# Patient Record
Sex: FEMALE | Race: WHITE | Employment: OTHER | URBAN - METROPOLITAN AREA
[De-identification: names, ages, dates, MRNs, and addresses within clinical notes are randomized per-mention and may not be internally consistent; named-entity substitution may affect disease eponyms.]

---

## 2018-02-12 PROBLEM — L08.9 FOOT INFECTION: Status: ACTIVE | Noted: 2018-02-12

## 2018-02-13 PROBLEM — M06.9 RHEUMATOID ARTHRITIS (HCC): Status: ACTIVE | Noted: 2018-02-13

## 2018-02-15 PROBLEM — I73.9 PERIPHERAL VASCULAR DISEASE (HCC): Status: ACTIVE | Noted: 2018-02-15

## 2018-02-15 PROBLEM — L03.116 CELLULITIS OF LEFT FOOT: Status: ACTIVE | Noted: 2018-02-12

## 2018-07-16 ENCOUNTER — HOSPITAL ENCOUNTER (OUTPATIENT)
Age: 78
Setting detail: OUTPATIENT SURGERY
Discharge: HOME | End: 2018-07-16
Attending: SURGERY | Admitting: SURGERY
Payer: MEDICARE

## 2018-07-16 ENCOUNTER — HOSPITAL ENCOUNTER (EMERGENCY)
Age: 78
Discharge: STILL ADMITTED OR ADMINISTRATIVE | End: 2018-07-16
Attending: EMERGENCY MEDICINE
Payer: MEDICARE

## 2018-07-16 VITALS
RESPIRATION RATE: 16 BRPM | SYSTOLIC BLOOD PRESSURE: 162 MMHG | WEIGHT: 161.9 LBS | BODY MASS INDEX: 25.41 KG/M2 | HEART RATE: 65 BPM | DIASTOLIC BLOOD PRESSURE: 67 MMHG | TEMPERATURE: 97.7 F | HEIGHT: 67 IN | OXYGEN SATURATION: 95 %

## 2018-07-16 DIAGNOSIS — M79.604 RIGHT LEG PAIN: ICD-10-CM

## 2018-07-16 DIAGNOSIS — I73.9 PVD (PERIPHERAL VASCULAR DISEASE) (HCC): ICD-10-CM

## 2018-07-16 DIAGNOSIS — I73.9 RIGHT LEG CLAUDICATION (HCC): Primary | ICD-10-CM

## 2018-07-16 DIAGNOSIS — I70.90 ATHEROSCLEROSIS: ICD-10-CM

## 2018-07-16 LAB
ANION GAP SERPL CALC-SCNC: 10 MMOL/L (ref 3–18)
APTT PPP: 26.6 SEC (ref 23–36.4)
BASOPHILS # BLD: 0 K/UL (ref 0–0.1)
BASOPHILS NFR BLD: 0 % (ref 0–2)
BUN SERPL-MCNC: 43 MG/DL (ref 7–18)
BUN/CREAT SERPL: 22 (ref 12–20)
CALCIUM SERPL-MCNC: 8.6 MG/DL (ref 8.5–10.1)
CHLORIDE SERPL-SCNC: 106 MMOL/L (ref 100–108)
CO2 SERPL-SCNC: 24 MMOL/L (ref 21–32)
CREAT SERPL-MCNC: 1.92 MG/DL (ref 0.6–1.3)
DIFFERENTIAL METHOD BLD: ABNORMAL
EOSINOPHIL # BLD: 0 K/UL (ref 0–0.4)
EOSINOPHIL NFR BLD: 0 % (ref 0–5)
ERYTHROCYTE [DISTWIDTH] IN BLOOD BY AUTOMATED COUNT: 13.2 % (ref 11.6–14.5)
GLUCOSE SERPL-MCNC: 129 MG/DL (ref 74–99)
HCT VFR BLD AUTO: 38.2 % (ref 35–45)
HGB BLD-MCNC: 12.9 G/DL (ref 12–16)
INR PPP: 1 (ref 0.8–1.2)
LYMPHOCYTES # BLD: 0.6 K/UL (ref 0.9–3.6)
LYMPHOCYTES NFR BLD: 9 % (ref 21–52)
MCH RBC QN AUTO: 29.1 PG (ref 24–34)
MCHC RBC AUTO-ENTMCNC: 33.8 G/DL (ref 31–37)
MCV RBC AUTO: 86.2 FL (ref 74–97)
MONOCYTES # BLD: 0 K/UL (ref 0.05–1.2)
MONOCYTES NFR BLD: 0 % (ref 3–10)
NEUTS SEG # BLD: 5.9 K/UL (ref 1.8–8)
NEUTS SEG NFR BLD: 91 % (ref 40–73)
PLATELET # BLD AUTO: 225 K/UL (ref 135–420)
PMV BLD AUTO: 9.4 FL (ref 9.2–11.8)
POTASSIUM SERPL-SCNC: 4.6 MMOL/L (ref 3.5–5.5)
PROTHROMBIN TIME: 12.4 SEC (ref 11.5–15.2)
RBC # BLD AUTO: 4.43 M/UL (ref 4.2–5.3)
SODIUM SERPL-SCNC: 140 MMOL/L (ref 136–145)
WBC # BLD AUTO: 6.5 K/UL (ref 4.6–13.2)

## 2018-07-16 PROCEDURE — C1894 INTRO/SHEATH, NON-LASER: HCPCS | Performed by: SURGERY

## 2018-07-16 PROCEDURE — 36140 INTRO NDL ICATH UPR/LXTR ART: CPT

## 2018-07-16 PROCEDURE — 74011250636 HC RX REV CODE- 250/636

## 2018-07-16 PROCEDURE — 80048 BASIC METABOLIC PNL TOTAL CA: CPT | Performed by: SURGERY

## 2018-07-16 PROCEDURE — 77030028837 HC SYR ANGI PWR INJ COEU -A: Performed by: SURGERY

## 2018-07-16 PROCEDURE — 85730 THROMBOPLASTIN TIME PARTIAL: CPT | Performed by: SURGERY

## 2018-07-16 PROCEDURE — 75810000275 HC EMERGENCY DEPT VISIT NO LEVEL OF CARE

## 2018-07-16 PROCEDURE — 75710 ARTERY X-RAYS ARM/LEG: CPT | Performed by: SURGERY

## 2018-07-16 PROCEDURE — 74011250636 HC RX REV CODE- 250/636: Performed by: SURGERY

## 2018-07-16 PROCEDURE — 99152 MOD SED SAME PHYS/QHP 5/>YRS: CPT | Performed by: SURGERY

## 2018-07-16 PROCEDURE — 99153 MOD SED SAME PHYS/QHP EA: CPT | Performed by: SURGERY

## 2018-07-16 PROCEDURE — 85025 COMPLETE CBC W/AUTO DIFF WBC: CPT | Performed by: SURGERY

## 2018-07-16 PROCEDURE — 74011000250 HC RX REV CODE- 250: Performed by: SURGERY

## 2018-07-16 PROCEDURE — 85610 PROTHROMBIN TIME: CPT | Performed by: SURGERY

## 2018-07-16 RX ORDER — CLINDAMYCIN PHOSPHATE 900 MG/50ML
900 INJECTION INTRAVENOUS ONCE
Status: COMPLETED | OUTPATIENT
Start: 2018-07-16 | End: 2018-07-16

## 2018-07-16 RX ORDER — MIDAZOLAM HYDROCHLORIDE 1 MG/ML
INJECTION, SOLUTION INTRAMUSCULAR; INTRAVENOUS AS NEEDED
Status: DISCONTINUED | OUTPATIENT
Start: 2018-07-16 | End: 2018-07-16 | Stop reason: HOSPADM

## 2018-07-16 RX ORDER — FENTANYL CITRATE 50 UG/ML
INJECTION, SOLUTION INTRAMUSCULAR; INTRAVENOUS AS NEEDED
Status: DISCONTINUED | OUTPATIENT
Start: 2018-07-16 | End: 2018-07-16 | Stop reason: HOSPADM

## 2018-07-16 RX ORDER — METHOTREXATE 2.5 MG/1
2.5 TABLET ORAL
Status: ON HOLD | COMMUNITY
End: 2018-09-20

## 2018-07-16 RX ORDER — SODIUM CHLORIDE 9 MG/ML
50 INJECTION, SOLUTION INTRAVENOUS CONTINUOUS
Status: DISCONTINUED | OUTPATIENT
Start: 2018-07-16 | End: 2018-07-16 | Stop reason: HOSPADM

## 2018-07-16 RX ORDER — LIDOCAINE HYDROCHLORIDE 10 MG/ML
INJECTION INFILTRATION; PERINEURAL AS NEEDED
Status: DISCONTINUED | OUTPATIENT
Start: 2018-07-16 | End: 2018-07-16 | Stop reason: HOSPADM

## 2018-07-16 RX ADMIN — CLINDAMYCIN PHOSPHATE 900 MG: 18 INJECTION, SOLUTION INTRAMUSCULAR; INTRAVENOUS at 15:00

## 2018-07-16 NOTE — H&P
[facility] 
[address], [address] [facility] [address] Geri Jovel, Dr. Markus Mccormick & Dr. Yaneli Almaguer [phone] FAX# [phone] History and Physical 
 
Patient: Juancho Suarez MRN: [id number]  SSN: [id number] YOB: 1940  Age: 66 y.o. Sex: female Subjective:  
  
Juancho Suarez is a 66 y.o. female who presents with ASO and critical ischemia of the toes. Conventional  Angio failed to open tibial arteries. .  
 
Past Medical History:  
Diagnosis Date  Asthma   
 no ER  
 GERD (gastroesophageal reflux disease)   
 good control  Hypertension   
 no meds  Osteoarthritis of right hip 8/27/2012  RHA (rheumatoid arthritis) ([address]  Thromboembolus ([address]   
 left leg Past Surgical History:  
Procedure Laterality Date  HX APPENDECTOMY  HX BACK SURGERY    
 removal bone spur  HX CATARACT REMOVAL    
 both eyes  HX CHOLECYSTECTOMY  HX HIP REPLACEMENT Right  HX HYSTERECTOMY  HX LUMBAR FUSION    
 HX ORTHOPAEDIC Left   
 amputation 3&4 toes of Left foot  VASCULAR SURGERY PROCEDURE UNLIST    
 blood clot removed No family history on file. Social History Substance Use Topics  Smoking status: Former Smoker Packs/day: 0.50 Years: 50.00 Quit date: 12/25/2017  Smokeless tobacco: Never Used  Alcohol use No  
  
Prior to Admission medications Medication Sig Start Date End Date Taking? Authorizing Provider  
lisinopril (PRINIVIL, ZESTRIL) 10 mg tablet Take  by mouth daily. Phys Other, MD  
acetaminophen (ACETAMINOPHEN EXTRA STRENGTH) 500 mg tablet Take 1,000 mg by mouth every six (6) hours as needed. Historical Provider Allergies Allergen Reactions  Aspirin Nausea and Vomiting  Iodine Hives and Itching  Pcn [Penicillins] Nausea and Vomiting  
  shakes Review of Systems: 
Pertinent items are noted in the History of Present Illness. Objective:  
 
Vitals:  
 07/16/18 1432 BP: 162/73 Pulse: 61 Resp: 18 Temp: 97.7 °F (36.5 °C) SpO2: 100% Weight: 73.4 kg (161 lb 14.4 oz) Height: 5' 7\" (1.702 m) Physical Exam: 
GENERAL: alert, cooperative, no distress, appears stated age EYE: negative LUNG: clear to auscultation bilaterally HEART: regular rate and rhythm, S1, S2 normal, no murmur, click, rub or gallop ABDOMEN: soft, non-tender. Bowel sounds normal. No masses,  no organomegaly EXTREMITIES: Right leg incisions well healed. Left foot with two toes with ischemic changes. Assessment:  
 
ASO with gangrene Plan: Attempt at pedal access angiogram with possible intervention. Signed By: Jolly Gosselin, MD   
 July 16, 2018

## 2018-07-16 NOTE — IP AVS SNAPSHOT
[address] Patient: Segundo Riley MRN: [id number] WOW:0/30/2156 About your hospitalization You were admitted on:  July 16, 2018 You last received care in the:  [address] [place] [address] You were discharged on:  July 16, 2018 Why you were hospitalized Your primary diagnosis was:  Not on File Follow-up Information Follow up With Details Comments Contact Info Mau Adams NP   [address] [facility] [address] [facility] [address] [phone] Discharge Orders None A check mau indicates which time of day the medication should be taken. My Medications CONTINUE taking these medications Instructions Each Dose to Equal  
 Morning Noon Evening Bedtime ACETAMINOPHEN EXTRA STRENGTH 500 mg tablet Generic drug:  acetaminophen Your last dose was: Your next dose is: Take 1,000 mg by mouth every six (6) hours as needed. 1000 mg  
    
   
   
   
  
 lisinopril 10 mg tablet Commonly known as:  Dickson Osmar Your last dose was: Your next dose is: Take  by mouth daily. methotrexate 2.5 mg tablet Commonly known as:  Andrea Savage Your last dose was: Your next dose is: Take 2.5 mg by mouth Every Thursday. 2.5 mg  
    
   
   
   
  
  
  
  
Discharge Instructions DISCHARGE SUMMARY from Nurse PATIENT INSTRUCTIONS: 
 
After general anesthesia or intravenous sedation, for 24 hours or while taking prescription Narcotics: · Limit your activities · Do not drive and operate hazardous machinery · Do not make important personal or business decisions · Do  not drink alcoholic beverages · If you have not urinated within 8 hours after discharge, please contact your surgeon on call. Report the following to your surgeon: · Excessive pain, swelling, redness or odor of or around the surgical area · Temperature over 100.5 · Nausea and vomiting lasting longer than 4 hours or if unable to take medications · Any signs of decreased circulation or nerve impairment to extremity: change in color, persistent  numbness, tingling, coldness or increase pain · Any questions What to do at Home: These are general instructions for a healthy lifestyle: No smoking/ No tobacco products/ Avoid exposure to second hand smoke Surgeon General's Warning:  Quitting smoking now greatly reduces serious risk to your health. Obesity, smoking, and sedentary lifestyle greatly increases your risk for illness A healthy diet, regular physical exercise & weight monitoring are important for maintaining a healthy lifestyle You may be retaining fluid if you have a history of heart failure or if you experience any of the following symptoms:  Weight gain of 3 pounds or more overnight or 5 pounds in a week, increased swelling in our hands or feet or shortness of breath while lying flat in bed. Please call your doctor as soon as you notice any of these symptoms; do not wait until your next office visit. Recognize signs and symptoms of STROKE: 
 
F-face looks uneven A-arms unable to move or move unevenly S-speech slurred or non-existent T-time-call 911 as soon as signs and symptoms begin-DO NOT go Back to bed or wait to see if you get better-TIME IS BRAIN. Warning Signs of HEART ATTACK Call 911 if you have these symptoms: 
? Chest discomfort. Most heart attacks involve discomfort in the center of the chest that lasts more than a few minutes, or that goes away and comes back. It can feel like uncomfortable pressure, squeezing, fullness, or pain. ? Discomfort in other areas of the upper body. Symptoms can include pain or discomfort in one or both arms, the back, neck, jaw, or stomach. ? Shortness of breath with or without chest discomfort. ? Other signs may include breaking out in a cold sweat, nausea, or lightheadedness. Don't wait more than five minutes to call [address]! Fast action can save your life. Calling 911 is almost always the fastest way to get lifesaving treatment. Emergency Medical Services staff can begin treatment when they arrive  up to an hour sooner than if someone gets to the hospital by car. The discharge information has been reviewed with the patient. The patient verbalized understanding. Discharge medications reviewed with the patient and appropriate educational materials and side effects teaching were provided. ___________________________________________________________________________________________________________________________________ Patient armband removed and given to patient to take home. Patient was informed of the privacy risks if armband lost or stolen Introducing [facility] & HEALTH SERVICES! [facility] introduces [employer] patient portal. Now you can access parts of your medical record, email your doctor's office, and request medication refills online. 1. In your internet browser, go to [email or web address] 2. Click on the First Time User? Click Here link in the Sign In box. You will see the New Member Sign Up page. 3. Enter your [employer] Access Code exactly as it appears below. You will not need to use this code after youve completed the sign-up process. If you do not sign up before the expiration date, you must request a new code. · [employer] Access Code: [email or web address] Expires: 10/11/2018 10:33 AM 
 
4. Enter the last four digits of your Social Security Number (xxxx) and Date of Birth (mm/dd/yyyy) as indicated and click Submit. You will be taken to the next sign-up page. 5. Create a MyChart ID.  This will be your [employer] login ID and cannot be changed, so think of one that is secure and easy to remember. 6. Create a [employer] password. You can change your password at any time. 7. Enter your Password Reset Question and Answer. This can be used at a later time if you forget your password. 8. Enter your e-mail address. You will receive e-mail notification when new information is available in [address] 9. Click Sign Up. You can now view and download portions of your medical record. 10. Click the Download Summary menu link to download a portable copy of your medical information. If you have questions, please visit the Frequently Asked Questions section of the [employer] website. Remember, [employer] is NOT to be used for urgent needs. For medical emergencies, dial 911. Now available from your iPhone and Android! Introducing Jacky Conte As a [facility] patient, I wanted to make you aware of our electronic visit tool called Jacky Conte. [employer] allows you to connect within minutes with a medical provider 24 hours a day, seven days a week via a mobile device or tablet or logging into a secure website from your computer. You can access Jacky Conte from anywhere in the [place]. A virtual visit might be right for you when you have a simple condition and feel like you just dont want to get out of bed, or cant get away from work for an appointment, when your regular [facility] provider is not available (evenings, weekends or holidays), or when youre out of town and need minor care. Electronic visits cost only $49 and if the [employer] provider determines a prescription is needed to treat your condition, one can be electronically transmitted to a nearby pharmacy*. Please take a moment to enroll today if you have not already done so. The enrollment process is free and takes just a few minutes.   To enroll, please download the [facility] jalil to your tablet or phone, or visit [email or web address] to enroll on your computer. And, as an [address] [place] [address] patient with a [employer] account, the results of your visits will be scanned into your electronic medical record and your primary care provider will be able to view the scanned results. We urge you to continue to see your regular [facility] provider for your ongoing medical care. And while your primary care provider may not be the one available when you seek a ffk environment virtual visit, the peace of mind you get from getting a real diagnosis real time can be priceless. For more information on ffk environment, view our Frequently Asked Questions (FAQs) at [email or web address]. Sincerely, 
 
Elissa Payne MD 
Chief Medical Officer [employer] *:  certain medications cannot be prescribed via ffk environment Providers Seen During Your Hospitalization Provider Specialty Primary office phone Jenn Vega MD Vascular Surgery [phone] Your Primary Care Physician (PCP) Primary Care Physician Office Phone Office Fax Rakan Kent [address] [phone] You are allergic to the following Allergen Reactions Aspirin Nausea and Vomiting Iodine Hives Itching Pcn (Penicillins) Nausea and Vomiting  
 shakes Recent Documentation Height Weight BMI OB Status Smoking Status 1.702 m 73.4 kg 25.36 kg/m2 Hysterectomy Former Smoker Emergency Contacts Name Discharge Info Relation Home Work Mobile [employer] CAREGIVER [3] Spouse [3] [phone] Patient Belongings The following personal items are in your possession at time of discharge: 
  Dental Appliances: None         Home Medications: None   Jewelry: None  Clothing: Footwear, Undergarments, Pants, Shirt Please provide this summary of care documentation to your next provider. Signatures-by signing, you are acknowledging that this After Visit Summary has been reviewed with you and you have received a copy. Patient Signature:  ____________________________________________________________ Date:  ____________________________________________________________  
  
Lucy Hero Provider Signature:  ____________________________________________________________ Date:  ____________________________________________________________

## 2018-07-16 NOTE — PROCEDURES
[facility]  [address], [address] [facility]  Dr. Eustacio Curling, Dr. Chalino Varghese  [phone] FAX# [phone]      Date: 7/16/2018  Pre-operative diagnosis: ASO with ulceration  Post-operative diagnosis: Same  Procedure: Ultrasound guidance for the cannulation of the left posterior tibial artery  Surgeon[facility]  Anesthesia\": Local with sedation  EBL\" Minimal;  Complications: None  Implants: None  Specimens: None    After informed consent obtained, patient placed supine on the table. Prepped and draped in the normal sterile fashion. Right posterior tibial artery identified under ultrasound guidance. Local anesthesia given in the skin overlying. Artery cannulated with micropuncture needle. The microwire would not pass. Several more attempts prove useless. Procedure aborted. Patient tolerated well.

## 2018-07-16 NOTE — DISCHARGE INSTRUCTIONS
DISCHARGE SUMMARY from Nurse    PATIENT INSTRUCTIONS:    After general anesthesia or intravenous sedation, for 24 hours or while taking prescription Narcotics:  · Limit your activities  · Do not drive and operate hazardous machinery  · Do not make important personal or business decisions  · Do  not drink alcoholic beverages  · If you have not urinated within 8 hours after discharge, please contact your surgeon on call. Report the following to your surgeon:  · Excessive pain, swelling, redness or odor of or around the surgical area  · Temperature over 100.5  · Nausea and vomiting lasting longer than 4 hours or if unable to take medications  · Any signs of decreased circulation or nerve impairment to extremity: change in color, persistent  numbness, tingling, coldness or increase pain  · Any questions    What to do at Home:  These are general instructions for a healthy lifestyle:    No smoking/ No tobacco products/ Avoid exposure to second hand smoke  Surgeon General's Warning:  Quitting smoking now greatly reduces serious risk to your health. Obesity, smoking, and sedentary lifestyle greatly increases your risk for illness    A healthy diet, regular physical exercise & weight monitoring are important for maintaining a healthy lifestyle    You may be retaining fluid if you have a history of heart failure or if you experience any of the following symptoms:  Weight gain of 3 pounds or more overnight or 5 pounds in a week, increased swelling in our hands or feet or shortness of breath while lying flat in bed. Please call your doctor as soon as you notice any of these symptoms; do not wait until your next office visit. Recognize signs and symptoms of STROKE:    F-face looks uneven    A-arms unable to move or move unevenly    S-speech slurred or non-existent    T-time-call 911 as soon as signs and symptoms begin-DO NOT go       Back to bed or wait to see if you get better-TIME IS BRAIN.     Warning Signs of HEART ATTACK     Call 911 if you have these symptoms:   Chest discomfort. Most heart attacks involve discomfort in the center of the chest that lasts more than a few minutes, or that goes away and comes back. It can feel like uncomfortable pressure, squeezing, fullness, or pain.  Discomfort in other areas of the upper body. Symptoms can include pain or discomfort in one or both arms, the back, neck, jaw, or stomach.  Shortness of breath with or without chest discomfort.  Other signs may include breaking out in a cold sweat, nausea, or lightheadedness. Don't wait more than five minutes to call 911 - MINUTES MATTER! Fast action can save your life. Calling 911 is almost always the fastest way to get lifesaving treatment. Emergency Medical Services staff can begin treatment when they arrive -- up to an hour sooner than if someone gets to the hospital by car. The discharge information has been reviewed with the patient. The patient verbalized understanding. Discharge medications reviewed with the patient and appropriate educational materials and side effects teaching were provided. ___________________________________________________________________________________________________________________________________  Patient armband removed and given to patient to take home.   Patient was informed of the privacy risks if armband lost or stolen

## 2018-07-16 NOTE — Clinical Note
History and physical documented and up to date, allergies reviewed, lab results reviewed, pre-procedure education provided, patient verbalized understanding of procedure, procedural consent signed and patient is NPO.

## 2018-07-16 NOTE — Clinical Note
artery. accessed successfully using ultrasound guidance. Number of attempts =  1.  LT Posterior tibial

## 2018-08-20 RX ORDER — FOLIC ACID 1 MG/1
TABLET ORAL
Status: ON HOLD | COMMUNITY
End: 2018-09-20

## 2018-08-20 RX ORDER — CALC/MAG/B COMPLEX/D3/HERB 61
TABLET ORAL
COMMUNITY
End: 2019-03-05

## 2018-08-20 RX ORDER — OXYCODONE AND ACETAMINOPHEN 5; 325 MG/1; MG/1
TABLET ORAL
Status: ON HOLD | COMMUNITY
Start: 2018-04-06 | End: 2018-09-20

## 2018-08-20 RX ORDER — LORATADINE 10 MG/1
TABLET ORAL
COMMUNITY

## 2018-08-20 NOTE — PERIOP NOTES
PAT - SURGICAL PRE-ADMISSION INSTRUCTIONS    NAME:  Cielo PALENCIA [facility] DATE:  8/20/2018    SURGERY DATE:  8/23/2018                                  SURGERY ARRIVAL TIME:   1030    1. Do NOT eat or drink anything, including candy or gum, after MIDNIGHT on 08/22/2018 , unless you have specific instructions from your Surgeon or Anesthesia Provider to do so. 2. No smoking on the day of surgery. 3. No alcohol 24 hours prior to the day of surgery. 4. No recreational drugs for one week prior to the day of surgery. 5. Leave all valuables, including money/purse, at home. 6. Remove all jewelry, nail polish, makeup (including mascara); no lotions, powders, deodorant, or perfume/cologne/after shave. 7. Glasses/Contact lenses and Dentures may be worn to the hospital.  They will be removed prior to surgery. 8. Call your doctor if symptoms of a cold or illness develop within 24 ours prior to surgery. 9. AN ADULT MUST DRIVE YOU HOME AFTER OUTPATIENT SURGERY. 10. If you are having an OUTPATIENT procedure, please make arrangements for a responsible adult to be with you for 24 hours after your surgery. 11. If you are admitted to the hospital, you will be assigned to a bed after surgery is complete. Normally a family member will not be able to see you until you are in your assigned bed. 15. Family is encouraged to accompany you to the hospital.  We ask visitors in the treatment area to be limited to ONE person at a time to ensure patient privacy. EXCEPTIONS WILL BE MADE AS NEEDED. 15. Children under 12 are discouraged from entering the treatment area and need to be supervised by an adult when in the waiting room. Special Instructions:    Bring any pertinent legal medical records. Patient Prep:    shower with anti-bacterial soap    These surgical instructions were reviewed with Mauro Raya during the PAT phone call. Directions:   On the morning of surgery, please go to the [facility]. Enter the building from the [facility] entrance, 1st floor (next to the Emergency Room entrance). Take the elevator to the 2nd floor. Sign in at the Registration Desk.     If you have any questions and/or concerns, please do not hesitate to call:  (Prior to the day of surgery)  [facility] unit:  [phone]  (Day of surgery)  [facility] unit:  [phone]

## 2018-08-22 ENCOUNTER — ANESTHESIA EVENT (OUTPATIENT)
Dept: SURGERY | Age: 78
DRG: 254 | End: 2018-08-22
Payer: MEDICARE

## 2018-08-23 ENCOUNTER — HOSPITAL ENCOUNTER (INPATIENT)
Age: 78
LOS: 4 days | Discharge: HOME HEALTH | DRG: 254 | End: 2018-08-27
Attending: SURGERY | Admitting: SURGERY
Payer: MEDICARE

## 2018-08-23 ENCOUNTER — ANESTHESIA (OUTPATIENT)
Dept: SURGERY | Age: 78
DRG: 254 | End: 2018-08-23
Payer: MEDICARE

## 2018-08-23 PROBLEM — I70.209 ATHEROSCLEROSIS OF ARTERIES OF EXTREMITIES (HCC): Status: ACTIVE | Noted: 2018-08-23

## 2018-08-23 LAB
ABO + RH BLD: NORMAL
ACT BLD: 114 SECS (ref 79–138)
ACT BLD: 230 SECS (ref 79–138)
BLOOD GROUP ANTIBODIES SERPL: NORMAL
BUN BLD-MCNC: 41 MG/DL (ref 7–18)
CHLORIDE BLD-SCNC: 107 MMOL/L (ref 100–108)
GLUCOSE BLD STRIP.AUTO-MCNC: 95 MG/DL (ref 74–106)
HCT VFR BLD CALC: 34 % (ref 36–49)
HGB BLD-MCNC: 11.6 G/DL (ref 12–16)
POTASSIUM BLD-SCNC: 4.7 MMOL/L (ref 3.5–5.5)
SODIUM BLD-SCNC: 139 MMOL/L (ref 136–145)
SPECIMEN EXP DATE BLD: NORMAL

## 2018-08-23 PROCEDURE — 74011250636 HC RX REV CODE- 250/636

## 2018-08-23 PROCEDURE — 74011000250 HC RX REV CODE- 250

## 2018-08-23 PROCEDURE — 77030008462 HC STPLR SKN PROX J&J -A: Performed by: SURGERY

## 2018-08-23 PROCEDURE — 74011000250 HC RX REV CODE- 250: Performed by: SURGERY

## 2018-08-23 PROCEDURE — 74011250636 HC RX REV CODE- 250/636: Performed by: SURGERY

## 2018-08-23 PROCEDURE — 77030002933 HC SUT MCRYL J&J -A: Performed by: SURGERY

## 2018-08-23 PROCEDURE — 77030011265 HC ELECTRD BLD HEX COVD -A: Performed by: SURGERY

## 2018-08-23 PROCEDURE — 77030011391 HC HD CUT VEIN URAC -E: Performed by: SURGERY

## 2018-08-23 PROCEDURE — 65610000006 HC RM INTENSIVE CARE

## 2018-08-23 PROCEDURE — 77030008477 HC STYL SATN SLP COVD -A: Performed by: ANESTHESIOLOGY

## 2018-08-23 PROCEDURE — 76010000176 HC OR TIME 4.5 TO 5 HR INTENSV-TIER 1: Performed by: SURGERY

## 2018-08-23 PROCEDURE — 77030020256 HC SOL INJ NACL 0.9%  500ML: Performed by: SURGERY

## 2018-08-23 PROCEDURE — 77030005401 HC CATH RAD ARRO -A: Performed by: ANESTHESIOLOGY

## 2018-08-23 PROCEDURE — 77030005518 HC CATH URETH FOL 2W BARD -B: Performed by: SURGERY

## 2018-08-23 PROCEDURE — 77030013079 HC BLNKT BAIR HGGR 3M -A: Performed by: ANESTHESIOLOGY

## 2018-08-23 PROCEDURE — 77030019908 HC STETH ESOPH SIMS -A: Performed by: ANESTHESIOLOGY

## 2018-08-23 PROCEDURE — 74011000272 HC RX REV CODE- 272: Performed by: SURGERY

## 2018-08-23 PROCEDURE — 74011250636 HC RX REV CODE- 250/636: Performed by: NURSE ANESTHETIST, CERTIFIED REGISTERED

## 2018-08-23 PROCEDURE — 77030031139 HC SUT VCRL2 J&J -A: Performed by: SURGERY

## 2018-08-23 PROCEDURE — 77030039266 HC ADH SKN EXOFIN S2SG -A: Performed by: SURGERY

## 2018-08-23 PROCEDURE — 77030008683 HC TU ET CUF COVD -A: Performed by: ANESTHESIOLOGY

## 2018-08-23 PROCEDURE — 86901 BLOOD TYPING SEROLOGIC RH(D): CPT

## 2018-08-23 PROCEDURE — 76210000006 HC OR PH I REC 0.5 TO 1 HR: Performed by: SURGERY

## 2018-08-23 PROCEDURE — 041N09Q BYPASS LEFT POPLITEAL ARTERY TO LOWER EXTREMITY ARTERY WITH AUTOLOGOUS VENOUS TISSUE, OPEN APPROACH: ICD-10-PCS | Performed by: SURGERY

## 2018-08-23 PROCEDURE — 77030002987 HC SUT PROL J&J -B: Performed by: SURGERY

## 2018-08-23 PROCEDURE — 77030006689 HC BLD OPHTH BVR BD -A: Performed by: SURGERY

## 2018-08-23 PROCEDURE — 85347 COAGULATION TIME ACTIVATED: CPT

## 2018-08-23 PROCEDURE — 77030014007 HC SPNG HEMSTAT J&J -B: Performed by: SURGERY

## 2018-08-23 PROCEDURE — 77030018836 HC SOL IRR NACL ICUM -A: Performed by: SURGERY

## 2018-08-23 PROCEDURE — 77030002996 HC SUT SLK J&J -A: Performed by: SURGERY

## 2018-08-23 PROCEDURE — P9041 ALBUMIN (HUMAN),5%, 50ML: HCPCS

## 2018-08-23 PROCEDURE — 03HY32Z INSERTION OF MONITORING DEVICE INTO UPPER ARTERY, PERCUTANEOUS APPROACH: ICD-10-PCS | Performed by: ANESTHESIOLOGY

## 2018-08-23 PROCEDURE — 84295 ASSAY OF SERUM SODIUM: CPT

## 2018-08-23 PROCEDURE — 77030032490 HC SLV COMPR SCD KNE COVD -B: Performed by: SURGERY

## 2018-08-23 PROCEDURE — 77030010938 HC CLP LIG TELE -A: Performed by: SURGERY

## 2018-08-23 PROCEDURE — 74011250637 HC RX REV CODE- 250/637: Performed by: NURSE ANESTHETIST, CERTIFIED REGISTERED

## 2018-08-23 PROCEDURE — 76060000040 HC ANESTHESIA 4.5 TO 5 HR: Performed by: SURGERY

## 2018-08-23 RX ORDER — NEOSTIGMINE METHYLSULFATE 5 MG/5 ML
SYRINGE (ML) INTRAVENOUS AS NEEDED
Status: DISCONTINUED | OUTPATIENT
Start: 2018-08-23 | End: 2018-08-23 | Stop reason: HOSPADM

## 2018-08-23 RX ORDER — FENTANYL CITRATE 50 UG/ML
INJECTION, SOLUTION INTRAMUSCULAR; INTRAVENOUS
Status: COMPLETED
Start: 2018-08-23 | End: 2018-08-23

## 2018-08-23 RX ORDER — OXYCODONE HYDROCHLORIDE 5 MG/1
5 TABLET ORAL
Status: DISCONTINUED | OUTPATIENT
Start: 2018-08-23 | End: 2018-08-23 | Stop reason: HOSPADM

## 2018-08-23 RX ORDER — ONDANSETRON 2 MG/ML
4 INJECTION INTRAMUSCULAR; INTRAVENOUS ONCE
Status: DISCONTINUED | OUTPATIENT
Start: 2018-08-23 | End: 2018-08-23 | Stop reason: HOSPADM

## 2018-08-23 RX ORDER — PROPOFOL 10 MG/ML
INJECTION, EMULSION INTRAVENOUS AS NEEDED
Status: DISCONTINUED | OUTPATIENT
Start: 2018-08-23 | End: 2018-08-23 | Stop reason: HOSPADM

## 2018-08-23 RX ORDER — SODIUM CHLORIDE 0.9 % (FLUSH) 0.9 %
5-10 SYRINGE (ML) INJECTION AS NEEDED
Status: DISCONTINUED | OUTPATIENT
Start: 2018-08-23 | End: 2018-08-27 | Stop reason: HOSPADM

## 2018-08-23 RX ORDER — SODIUM CHLORIDE 0.9 % (FLUSH) 0.9 %
5-10 SYRINGE (ML) INJECTION AS NEEDED
Status: DISCONTINUED | OUTPATIENT
Start: 2018-08-23 | End: 2018-08-23 | Stop reason: HOSPADM

## 2018-08-23 RX ORDER — SODIUM CHLORIDE 0.9 % (FLUSH) 0.9 %
5-10 SYRINGE (ML) INJECTION EVERY 8 HOURS
Status: DISCONTINUED | OUTPATIENT
Start: 2018-08-23 | End: 2018-08-27 | Stop reason: HOSPADM

## 2018-08-23 RX ORDER — ALBUMIN HUMAN 50 G/1000ML
SOLUTION INTRAVENOUS AS NEEDED
Status: DISCONTINUED | OUTPATIENT
Start: 2018-08-23 | End: 2018-08-23 | Stop reason: HOSPADM

## 2018-08-23 RX ORDER — LIDOCAINE HYDROCHLORIDE 20 MG/ML
INJECTION, SOLUTION EPIDURAL; INFILTRATION; INTRACAUDAL; PERINEURAL AS NEEDED
Status: DISCONTINUED | OUTPATIENT
Start: 2018-08-23 | End: 2018-08-23 | Stop reason: HOSPADM

## 2018-08-23 RX ORDER — SUCCINYLCHOLINE CHLORIDE 20 MG/ML
INJECTION INTRAMUSCULAR; INTRAVENOUS AS NEEDED
Status: DISCONTINUED | OUTPATIENT
Start: 2018-08-23 | End: 2018-08-23 | Stop reason: HOSPADM

## 2018-08-23 RX ORDER — FAMOTIDINE 20 MG/1
20 TABLET, FILM COATED ORAL ONCE
Status: COMPLETED | OUTPATIENT
Start: 2018-08-23 | End: 2018-08-23

## 2018-08-23 RX ORDER — FENTANYL CITRATE 50 UG/ML
50 INJECTION, SOLUTION INTRAMUSCULAR; INTRAVENOUS
Status: DISCONTINUED | OUTPATIENT
Start: 2018-08-23 | End: 2018-08-23 | Stop reason: HOSPADM

## 2018-08-23 RX ORDER — SODIUM CHLORIDE, SODIUM LACTATE, POTASSIUM CHLORIDE, CALCIUM CHLORIDE 600; 310; 30; 20 MG/100ML; MG/100ML; MG/100ML; MG/100ML
25 INJECTION, SOLUTION INTRAVENOUS CONTINUOUS
Status: DISCONTINUED | OUTPATIENT
Start: 2018-08-23 | End: 2018-08-23 | Stop reason: HOSPADM

## 2018-08-23 RX ORDER — LISINOPRIL 10 MG/1
10 TABLET ORAL DAILY
Status: DISCONTINUED | OUTPATIENT
Start: 2018-08-24 | End: 2018-08-27 | Stop reason: HOSPADM

## 2018-08-23 RX ORDER — BACITRACIN 50000 [IU]/1
INJECTION, POWDER, FOR SOLUTION INTRAMUSCULAR AS NEEDED
Status: DISCONTINUED | OUTPATIENT
Start: 2018-08-23 | End: 2018-08-23 | Stop reason: HOSPADM

## 2018-08-23 RX ORDER — SODIUM CHLORIDE, SODIUM LACTATE, POTASSIUM CHLORIDE, CALCIUM CHLORIDE 600; 310; 30; 20 MG/100ML; MG/100ML; MG/100ML; MG/100ML
75 INJECTION, SOLUTION INTRAVENOUS CONTINUOUS
Status: DISPENSED | OUTPATIENT
Start: 2018-08-23 | End: 2018-08-23

## 2018-08-23 RX ORDER — HYDROMORPHONE HYDROCHLORIDE 1 MG/ML
1 INJECTION, SOLUTION INTRAMUSCULAR; INTRAVENOUS; SUBCUTANEOUS
Status: DISCONTINUED | OUTPATIENT
Start: 2018-08-23 | End: 2018-08-27 | Stop reason: HOSPADM

## 2018-08-23 RX ORDER — ONDANSETRON 2 MG/ML
INJECTION INTRAMUSCULAR; INTRAVENOUS
Status: COMPLETED
Start: 2018-08-23 | End: 2018-08-23

## 2018-08-23 RX ORDER — DIPHENHYDRAMINE HYDROCHLORIDE 50 MG/ML
12.5 INJECTION, SOLUTION INTRAMUSCULAR; INTRAVENOUS
Status: DISCONTINUED | OUTPATIENT
Start: 2018-08-23 | End: 2018-08-23 | Stop reason: HOSPADM

## 2018-08-23 RX ORDER — HEPARIN SODIUM 1000 [USP'U]/ML
INJECTION, SOLUTION INTRAVENOUS; SUBCUTANEOUS AS NEEDED
Status: DISCONTINUED | OUTPATIENT
Start: 2018-08-23 | End: 2018-08-23 | Stop reason: HOSPADM

## 2018-08-23 RX ORDER — SODIUM CHLORIDE 9 MG/ML
INJECTION, SOLUTION INTRAVENOUS
Status: DISCONTINUED | OUTPATIENT
Start: 2018-08-23 | End: 2018-08-23 | Stop reason: HOSPADM

## 2018-08-23 RX ORDER — GLYCOPYRROLATE 0.2 MG/ML
INJECTION INTRAMUSCULAR; INTRAVENOUS AS NEEDED
Status: DISCONTINUED | OUTPATIENT
Start: 2018-08-23 | End: 2018-08-23 | Stop reason: HOSPADM

## 2018-08-23 RX ORDER — PANTOPRAZOLE SODIUM 40 MG/1
40 TABLET, DELAYED RELEASE ORAL
Status: DISCONTINUED | OUTPATIENT
Start: 2018-08-24 | End: 2018-08-27 | Stop reason: HOSPADM

## 2018-08-23 RX ORDER — VECURONIUM BROMIDE FOR INJECTION 1 MG/ML
INJECTION, POWDER, LYOPHILIZED, FOR SOLUTION INTRAVENOUS AS NEEDED
Status: DISCONTINUED | OUTPATIENT
Start: 2018-08-23 | End: 2018-08-23 | Stop reason: HOSPADM

## 2018-08-23 RX ORDER — FENTANYL CITRATE 50 UG/ML
INJECTION, SOLUTION INTRAMUSCULAR; INTRAVENOUS AS NEEDED
Status: DISCONTINUED | OUTPATIENT
Start: 2018-08-23 | End: 2018-08-23 | Stop reason: HOSPADM

## 2018-08-23 RX ORDER — NALOXONE HYDROCHLORIDE 0.4 MG/ML
0.1 INJECTION, SOLUTION INTRAMUSCULAR; INTRAVENOUS; SUBCUTANEOUS AS NEEDED
Status: DISCONTINUED | OUTPATIENT
Start: 2018-08-23 | End: 2018-08-23 | Stop reason: HOSPADM

## 2018-08-23 RX ORDER — ACETAMINOPHEN 325 MG/1
650 TABLET ORAL
Status: DISCONTINUED | OUTPATIENT
Start: 2018-08-23 | End: 2018-08-27 | Stop reason: HOSPADM

## 2018-08-23 RX ORDER — CALC/MAG/B COMPLEX/D3/HERB 61
15 TABLET ORAL
Status: DISCONTINUED | OUTPATIENT
Start: 2018-08-24 | End: 2018-08-23 | Stop reason: CLARIF

## 2018-08-23 RX ORDER — ONDANSETRON 2 MG/ML
4 INJECTION INTRAMUSCULAR; INTRAVENOUS
Status: DISCONTINUED | OUTPATIENT
Start: 2018-08-23 | End: 2018-08-27 | Stop reason: HOSPADM

## 2018-08-23 RX ORDER — ONDANSETRON 2 MG/ML
INJECTION INTRAMUSCULAR; INTRAVENOUS AS NEEDED
Status: DISCONTINUED | OUTPATIENT
Start: 2018-08-23 | End: 2018-08-23 | Stop reason: HOSPADM

## 2018-08-23 RX ORDER — HEPARIN SODIUM 5000 [USP'U]/ML
5000 INJECTION, SOLUTION INTRAVENOUS; SUBCUTANEOUS EVERY 8 HOURS
Status: DISCONTINUED | OUTPATIENT
Start: 2018-08-23 | End: 2018-08-27 | Stop reason: HOSPADM

## 2018-08-23 RX ORDER — LIDOCAINE HYDROCHLORIDE 10 MG/ML
0.1 INJECTION, SOLUTION EPIDURAL; INFILTRATION; INTRACAUDAL; PERINEURAL AS NEEDED
Status: DISCONTINUED | OUTPATIENT
Start: 2018-08-23 | End: 2018-08-23 | Stop reason: HOSPADM

## 2018-08-23 RX ORDER — OXYCODONE AND ACETAMINOPHEN 5; 325 MG/1; MG/1
1 TABLET ORAL
Status: DISCONTINUED | OUTPATIENT
Start: 2018-08-23 | End: 2018-08-27 | Stop reason: HOSPADM

## 2018-08-23 RX ORDER — HYDROMORPHONE HYDROCHLORIDE 1 MG/ML
0.5 INJECTION, SOLUTION INTRAMUSCULAR; INTRAVENOUS; SUBCUTANEOUS
Status: DISCONTINUED | OUTPATIENT
Start: 2018-08-23 | End: 2018-08-27 | Stop reason: HOSPADM

## 2018-08-23 RX ADMIN — FENTANYL CITRATE 50 MCG: 50 INJECTION, SOLUTION INTRAMUSCULAR; INTRAVENOUS at 18:25

## 2018-08-23 RX ADMIN — FENTANYL CITRATE 50 MCG: 50 INJECTION, SOLUTION INTRAMUSCULAR; INTRAVENOUS at 14:05

## 2018-08-23 RX ADMIN — SODIUM CHLORIDE, SODIUM LACTATE, POTASSIUM CHLORIDE, AND CALCIUM CHLORIDE 25 ML/HR: 600; 310; 30; 20 INJECTION, SOLUTION INTRAVENOUS at 11:19

## 2018-08-23 RX ADMIN — PROPOFOL 130 MG: 10 INJECTION, EMULSION INTRAVENOUS at 13:13

## 2018-08-23 RX ADMIN — FENTANYL CITRATE 50 MCG: 50 INJECTION, SOLUTION INTRAMUSCULAR; INTRAVENOUS at 18:10

## 2018-08-23 RX ADMIN — VECURONIUM BROMIDE FOR INJECTION 5 MG: 1 INJECTION, POWDER, LYOPHILIZED, FOR SOLUTION INTRAVENOUS at 13:23

## 2018-08-23 RX ADMIN — DIPHENHYDRAMINE HYDROCHLORIDE 12.5 MG: 50 INJECTION, SOLUTION INTRAMUSCULAR; INTRAVENOUS at 18:12

## 2018-08-23 RX ADMIN — GLYCOPYRROLATE 0.4 MG: 0.2 INJECTION INTRAMUSCULAR; INTRAVENOUS at 17:12

## 2018-08-23 RX ADMIN — HYDROMORPHONE HYDROCHLORIDE 0.5 MG: 1 INJECTION, SOLUTION INTRAMUSCULAR; INTRAVENOUS; SUBCUTANEOUS at 18:15

## 2018-08-23 RX ADMIN — VECURONIUM BROMIDE FOR INJECTION 2 MG: 1 INJECTION, POWDER, LYOPHILIZED, FOR SOLUTION INTRAVENOUS at 16:00

## 2018-08-23 RX ADMIN — FENTANYL CITRATE 50 MCG: 50 INJECTION, SOLUTION INTRAMUSCULAR; INTRAVENOUS at 17:55

## 2018-08-23 RX ADMIN — Medication 10 ML: at 21:49

## 2018-08-23 RX ADMIN — FENTANYL CITRATE 25 MCG: 50 INJECTION, SOLUTION INTRAMUSCULAR; INTRAVENOUS at 14:12

## 2018-08-23 RX ADMIN — ONDANSETRON 4 MG: 2 INJECTION, SOLUTION INTRAMUSCULAR; INTRAVENOUS at 20:17

## 2018-08-23 RX ADMIN — SUCCINYLCHOLINE CHLORIDE 100 MG: 20 INJECTION INTRAMUSCULAR; INTRAVENOUS at 13:13

## 2018-08-23 RX ADMIN — ONDANSETRON 4 MG: 2 INJECTION INTRAMUSCULAR; INTRAVENOUS at 12:45

## 2018-08-23 RX ADMIN — SODIUM CHLORIDE: 9 INJECTION, SOLUTION INTRAVENOUS at 13:17

## 2018-08-23 RX ADMIN — Medication 3 MG: at 17:12

## 2018-08-23 RX ADMIN — FENTANYL CITRATE 50 MCG: 50 INJECTION, SOLUTION INTRAMUSCULAR; INTRAVENOUS at 13:09

## 2018-08-23 RX ADMIN — HEPARIN SODIUM 7000 UNITS: 1000 INJECTION, SOLUTION INTRAVENOUS; SUBCUTANEOUS at 15:19

## 2018-08-23 RX ADMIN — ALBUMIN HUMAN 250 ML: 50 SOLUTION INTRAVENOUS at 15:00

## 2018-08-23 RX ADMIN — SODIUM CHLORIDE 1000 MG: 900 INJECTION, SOLUTION INTRAVENOUS at 11:27

## 2018-08-23 RX ADMIN — FENTANYL CITRATE 25 MCG: 50 INJECTION, SOLUTION INTRAMUSCULAR; INTRAVENOUS at 14:10

## 2018-08-23 RX ADMIN — ONDANSETRON 4 MG: 2 INJECTION INTRAMUSCULAR; INTRAVENOUS at 17:12

## 2018-08-23 RX ADMIN — HYDROMORPHONE HYDROCHLORIDE 1 MG: 1 INJECTION, SOLUTION INTRAMUSCULAR; INTRAVENOUS; SUBCUTANEOUS at 21:48

## 2018-08-23 RX ADMIN — FENTANYL CITRATE 50 MCG: 50 INJECTION, SOLUTION INTRAMUSCULAR; INTRAVENOUS at 13:13

## 2018-08-23 RX ADMIN — LIDOCAINE HYDROCHLORIDE 80 MG: 20 INJECTION, SOLUTION EPIDURAL; INFILTRATION; INTRACAUDAL; PERINEURAL at 13:13

## 2018-08-23 RX ADMIN — FAMOTIDINE 20 MG: 20 TABLET ORAL at 11:17

## 2018-08-23 RX ADMIN — FENTANYL CITRATE 50 MCG: 50 INJECTION, SOLUTION INTRAMUSCULAR; INTRAVENOUS at 16:45

## 2018-08-23 RX ADMIN — HEPARIN SODIUM 5000 UNITS: 5000 INJECTION INTRAVENOUS; SUBCUTANEOUS at 21:48

## 2018-08-23 RX ADMIN — VECURONIUM BROMIDE FOR INJECTION 2 MG: 1 INJECTION, POWDER, LYOPHILIZED, FOR SOLUTION INTRAVENOUS at 14:54

## 2018-08-23 RX ADMIN — FENTANYL CITRATE 25 MCG: 50 INJECTION, SOLUTION INTRAMUSCULAR; INTRAVENOUS at 16:07

## 2018-08-23 RX ADMIN — FENTANYL CITRATE 25 MCG: 50 INJECTION, SOLUTION INTRAMUSCULAR; INTRAVENOUS at 14:54

## 2018-08-23 NOTE — ANESTHESIA PROCEDURE NOTES
Arterial Line Placement    Start time: 8/23/2018 1:20 PM  End time: 8/23/2018 1:39 PM  Performed by: Oralia Harris  Authorized by: Oralia Harris     Pre-Procedure  Indications:  Arterial pressure monitoring and blood sampling  Preanesthetic Checklist: patient identified, risks and benefits discussed, anesthesia consent, site marked, patient being monitored, timeout performed and patient being monitored      Procedure:   Prep:  Chlorhexidine  Seldinger Technique?: Yes    Orientation:  Left  Location:  Brachical artery  Catheter size:  20 G  Number of attempts:  3    Assessment:   Post-procedure:  Line secured and sterile dressing applied  Patient Tolerance:  Patient tolerated the procedure well with no immediate complications  Comment:   Continuous ultrasound used. Failed attempts at right radial wrist and left radial wrist.  Small vessel on ultrasound that narrows intermittantly.   Micropuncture kit used just below [facility] sucessfully

## 2018-08-23 NOTE — CONSULTS
2 [facility]  Hospitalist Division    Consult Note    Patient: Adelfo Worrell MRN: [id number]  CSN: [id number]    YOB: 1940  Age: 66 y.o. Sex: female    DOA: 8/23/2018 LOS:  LOS: 0 days        Requesting Physician:  Dr. Ramirez Velez   Reason for Consultation:  Medical Management    Chief Complaint:  Left popliteal to tibial artery bypass with greater saphenous vein     Assessment/Plan     Patient Active Problem List   Diagnosis Code    Cellulitis of left foot L03. 116    Rheumatoid arthritis (HCC) M06.9    Peripheral vascular disease (HCC) I73.9       A/P:  S/p left popliteal to tibial artery bypass  -defer primary  -pain meds prn  -defer mobilization per primary    GERD  -prevacid tomorrow am     HTN  -monitor BP  -will hold BP meds today-restart lisinopril tomorrow     Asthma  -monitor sats     -We appreciate the consultation for medical management and appreciate being able to be involved with their care during hospitalization. HPI:     Adelfo Worrell is a 66 y.o. female with a hx of HTN, RA, PVD,asthma, GERD, OA of right hip, thromboembolus who presents to undergo an elective left popliteal to tibial artery bypass. We were consulted for medical management of this patient.      Past Medical History:   Diagnosis Date    Asthma     no ER    GERD (gastroesophageal reflux disease)     good control    Hypertension     no meds    Osteoarthritis of right hip 8/27/2012    [facility]HA (rheumatoid arthritis) ([facility] [address]     Thromboembolus (HCC)     left leg    Toe amputation status, left ([facility] [address] 02/2018    2 toes removed       Past Surgical History:   Procedure Laterality Date    HX APPENDECTOMY      HX BACK SURGERY      removal bone spur    HX CATARACT REMOVAL      both eyes    HX CHOLECYSTECTOMY      HX HIP REPLACEMENT Right     HX HYSTERECTOMY      HX LUMBAR FUSION      HX ORTHOPAEDIC Left     amputation 3&4 toes of Left foot    VASCULAR SURGERY PROCEDURE UNLIST blood clot removed       History reviewed. No pertinent family history. Social History     Social History    Marital status:      Spouse name: N/A    Number of children: N/A    Years of education: N/A     Social History Main Topics    Smoking status: Former Smoker     Packs/day: 0.50     Years: 50.00     Quit date: 12/25/2017    Smokeless tobacco: Never Used    Alcohol use No    Drug use: No    Sexual activity: Not Asked     Other Topics Concern    None     Social History Narrative       Prior to Admission medications    Medication Sig Start Date End Date Taking? Authorizing Provider   oxyCODONE-acetaminophen (PERCOCET) 5-325 mg per tablet Take 1 Tab by Mouth Every 4 Hours As Needed for Pain. Do not exceed 4000 mg of acetaminophen per day. 4/6/18  Yes Historical Provider   loratadine (CLARITIN) 10 mg tablet Take 1 tablet every day by oral route as needed. Historical Provider   folic acid (FOLVITE) 1 mg tablet folic acid 1 mg tablet    Historical Provider   PRENATAL -EYTZ-LAQRF ACID PO 1 a day (gummie)    Historical Provider   lansoprazole (PREVACID) 15 mg capsule Take 1 capsule every day by oral route. Historical Provider   methotrexate (RHEUMATREX) 2.5 mg tablet Take 2.5 mg by mouth Every Thursday. Historical Provider   lisinopril (PRINIVIL, ZESTRIL) 10 mg tablet Take  by mouth daily. Phys Other, MD   acetaminophen (ACETAMINOPHEN EXTRA STRENGTH) 500 mg tablet Take 1,000 mg by mouth every six (6) hours as needed.       Historical Provider       Allergies   Allergen Reactions    Aspirin Nausea and Vomiting and Nausea Only    Clindamycin Rash    Iodine Hives and Itching    Pcn [Penicillins] Nausea and Vomiting     shakes       Review of Systems  - fever, - chills, - fatigue, - weight loss, - night sweats   - sore throat, - sinus congestion, - lymphadenopathy, - vision changes  - CP, -  palpitations  - dyspnea on exertion, - dyspnea at rest, - cough, - hemoptysis  - nausea, - vomiting, - diarrhea, - abdominal pain, - reflux, - dysphagia  - dysuria, - hematuria, - urinary frequency  - rash, - pruritis  - back pain, - neck pain, - myalgia, - arthralgia  - H/A, + numbness, - tingling, - weakness, - slurred speech    Physical Exam:      Visit Vitals    Ht 5' 7\" (1.702 m)    Wt 71.8 kg (158 lb 6 oz)    BMI 24.81 kg/m2       Physical Exam:  Gen: In general, this is a well nourished female in no acute distress. HEENT: Sclerae nonicteric. Oral mucous membranes moist.    Neck: Supple with midline trachea. CV: RRR without murmur or rub appreciated. Resp:Respirations are unlabored without use of accessory muscles. Lung fields bilaterally without wheezes or rhonchi. Abd: Soft, nontender, nondistended. Normoactive bowel sounds. Extrem: Extremities are warm, without cyanosis or clubbing. No pitting pretibial edema. Palpable distal pulses X 4. Amputations on R; necrotic 2nd metatarsal; erythematous R big toe   Skin: Warm, no visible rashes. Neuro: Patient is alert, oriented, and cooperative. No obvious focal defects. Moves all 4 extremities. Labs Reviewed:    No results found for this or any previous visit (from the past 24 hour(s)).     Imaging Reviewed:

## 2018-08-23 NOTE — ANESTHESIA PREPROCEDURE EVALUATION
Anesthetic History   No history of anesthetic complications            Review of Systems / Medical History  Patient summary reviewed and pertinent labs reviewed    Pulmonary  Within defined limits                 Neuro/Psych   Within defined limits           Cardiovascular    Hypertension: well controlled          PAD    Exercise tolerance: >4 METS     GI/Hepatic/Renal  Within defined limits              Endo/Other        Arthritis     Other Findings   Comments: Documentation of current medication  Current medications obtained, documented and obtained? YES      Risk Factors for Postoperative nausea/vomiting:       History of postoperative nausea/vomiting? NO       Female? YES       Motion sickness? NO       Intended opioid administration for postoperative analgesia? YES      Smoking Abstinence:  Current Smoker? NO  Elective Surgery? YES  Seen preoperatively by anesthesiologist or proxy prior to day of surgery? YES  Pt abstained from smoking 24 hours prior to anesthesia?  N/A    Preventive care/screening for High Blood Pressure:  Aged 18 years and older: YES  Screened for high blood pressure: YES  Patients with high blood pressure referred to primary care provider   for BP management: YES                 Physical Exam    Airway  Mallampati: II  TM Distance: 4 - 6 cm  Neck ROM: normal range of motion   Mouth opening: Normal     Cardiovascular  Regular rate and rhythm,  S1 and S2 normal,  no murmur, click, rub, or gallop  Rhythm: regular  Rate: normal         Dental    Dentition: Full lower dentures and Full upper dentures     Pulmonary  Breath sounds clear to auscultation               Abdominal  GI exam deferred       Other Findings            Anesthetic Plan    ASA: 3  Anesthesia type: general    Monitoring Plan: Arterial line      Induction: Intravenous  Anesthetic plan and risks discussed with: Patient

## 2018-08-23 NOTE — PROGRESS NOTES
Pharmacy: Non-Formulary Medication [facility]    Please note that medication (lansoprazole) is not on the formulary and has been changed to pantoprazole. If patient prefers to take home medication, please have family member bring from home for pharmacy to verify. Please call pharmacy for questions.     Thanks,  Jolynn Teixeira, PHARMD

## 2018-08-23 NOTE — PERIOP NOTES
Osiris Triana (daughter [phone]) and Radha Donnelly On license of [facility] [phone].) will be here during procedure. Permission to give medical information.

## 2018-08-23 NOTE — BRIEF OP NOTE
BRIEF OPERATIVE NOTE    Date of Procedure: 8/23/2018   Preoperative Diagnosis:Atherosclerotic disease with gangrene  Postoperative Diagnosis: Same  Procedure(s):  LEFT POPLITEAL TO TIBIAL ARTERY BYPASS WITH GREATER SAPHENOUS VEIN  Surgeon(s) and Role:     * José Miguel Silvestre MD     * Huong Cruz MD - Primary             Surgical Staff:  Circ-1: Edmund Gorman Tech-1: Evans Joel  Scrub Tech-Relief: Keerthi Solomon  Surg Asst-1: Aris Miles  Surg Asst-Relief: Vonnie Root  Event Time In   Incision Start 1356   Incision Close      Anesthesia: General   Estimated Blood Loss: 50mL  Specimens: None  Findings: Palpable PT pulse at conclusion of case.    Complications: None  Implants: None

## 2018-08-23 NOTE — ANESTHESIA POSTPROCEDURE EVALUATION
Post-Anesthesia Evaluation and Assessment    Patient: Jackie León MRN: [id number]  SSN: [id number]    YOB: 1940  Age: 66 y.o. Sex: female      Data from PACU flowsheet    Cardiovascular Function/Vital Signs  Visit Vitals    /64    Pulse 67    Temp 37.1 °C (98.7 °F)    Resp 17    Ht 5' 7\" (1.702 m)    Wt 71.7 kg (158 lb)    SpO2 99%    BMI 24.75 kg/m2       Patient is status post general anesthesia for Procedure(s):  LEFT POPLITEAL TO TIBIAL ARTERY BYPASS WITH GREATER SAPHENOUS VEIN. Nausea/Vomiting: controlled    Postoperative hydration reviewed and adequate. Pain:  Pain Scale 1: Visual (08/23/18 1832)  Pain Intensity 1: 0 (08/23/18 1832)   Managed      Mental Status and Level of Consciousness: Alert and oriented     Pulmonary Status:   O2 Device: Oxygen mask (08/23/18 1744)   Adequate oxygenation and airway patent    Complications related to anesthesia: None    Post-anesthesia assessment completed.  No concerns    Signed By: Kit Worrell MD     August 23, 2018

## 2018-08-23 NOTE — IP AVS SNAPSHOT
Laila Sanz 
 
 
 [address] [place] [address] [address] Patient: Nita Wright MRN: [id number] RFP:[id number] About your hospitalization You were admitted on:  August 23, 2018 You last received care in the:  [address] [place] [address],[address] You were discharged on:  August 27, 2018 Why you were hospitalized Your primary diagnosis was:  Peripheral Vascular Disease (Hcc) Your diagnoses also included:  Atherosclerosis Of Arteries Of Extremities (Hcc) Follow-up Information Follow up With Details Comments Contact Info Ana Rico NP   [address] [facility] 409 [facility] [id number] [phone] Allyn Rabago MD In 2 weeks for follow up 655 [facility] [address] 2201 [facility] [address] [phone] Discharge Orders Procedure Order Date Status Priority Quantity Spec Type Associated Dx CALL YOUR DOCTOR For: Temperature greater than 100.4., Persistant nausea and vomiting., Severe uncontrolled pain. , Redness, tenderness, or signs of infection. For new or worsened pain in the chest or around the wound, a rapid hear rate or reddenin. .. 08/27/18 1858 Normal Routine 1 Comments: For new or worsened pain in the chest or around the wound, a rapid hear rate or reddening of the skin, or for bleeding / pus draining from the incision. Questions: For:  Temperature greater than 100.4. For:  Persistant nausea and vomiting. For:  Severe uncontrolled pain. For:  Redness, tenderness, or signs of infection. ACTIVITY AFTER DISCHARGE Patient should: Shower on day of dressing removal(no baths). , Restrict lifting, Restrict weight bearing Avoid heavy lifting and extreme shoulder movements (tennis, baseball, golf) for at least 6 to 8 weeks after surgery to. .. 08/27/18 1858 Normal Routine 1   Comments:  Avoid heavy lifting and extreme shoulder movements (tennis, baseball, golf) for at least 6 to 8 weeks after surgery to allow the breastbone (sternum) to heal. 
Do not use erectile dysfunction drugs if you are taking nitrates. If not on nitrates, discuss with your healthcare provider prior to initiating them. Questions: Patient should: Shower on day of dressing removal(no baths). Patient should:  Restrict lifting Patient should:  Restrict weight bearing DIET DIABETIC CONSISTENT CARB Regular 08/27/18 1858 Normal Routine 1 Questions: Texture:  Regular WOUND CARE 08/27/18 1858 Normal Routine 1 Comments:  Please shower every day, clean wounds with soap and water, then pad dry lightly A check mau indicates which time of day the medication should be taken. My Medications START taking these medications Instructions Each Dose to Equal  
 Morning Noon Evening Bedtime  
 clopidogrel 75 mg Tab Commonly known as:  PLAVIX Start taking on:  8/28/2018 Your last dose was: Your next dose is: Take 1 Tab by mouth daily. 75 mg CONTINUE taking these medications Instructions Each Dose to Equal  
 Morning Noon Evening Bedtime ACETAMINOPHEN EXTRA STRENGTH 500 mg tablet Generic drug:  acetaminophen Your last dose was: Your next dose is: Take 1,000 mg by mouth every six (6) hours as needed. 1000 mg CLARITIN 10 mg tablet Generic drug:  loratadine Your last dose was: Your next dose is: Take 1 tablet every day by oral route as needed. folic acid 1 mg tablet Commonly known as:  Shirin Your last dose was: Your next dose is:    
   
   
 folic acid 1 mg tablet  
     
   
   
   
  
 lisinopril 10 mg tablet Commonly known as:  Arlette Alvarez Your last dose was: Your next dose is: Take  by mouth daily. methotrexate 2.5 mg tablet Commonly known as:  Evelynwilfredo Franklin Your last dose was: Your next dose is: Take 2.5 mg by mouth Every Thursday. 2.5 mg  
    
   
   
   
  
 oxyCODONE-acetaminophen 5-325 mg per tablet Commonly known as:  PERCOCET Your last dose was: Your next dose is: Take 1 Tab by Mouth Every 4 Hours As Needed for Pain. Do not exceed 4000 mg of acetaminophen per day. PRENATAL -IRON-FOLIC ACID PO Your last dose was: Your next dose is:    
   
   
 1 a day (gummie) PREVACID 15 mg capsule Generic drug:  lansoprazole Your last dose was: Your next dose is: Take 1 capsule every day by oral route. Where to Get Your Medications These medications were sent to [address] [place], [place] [address] [address] [address] Phone:  [phone]  
  clopidogrel 75 mg Tab Opioid Education Prescription Opioids: What You Need to Know: 
 
 
 
F-face looks uneven A-arms unable to move or move unevenly S-speech slurred or non-existent T-time-call 911 as soon as signs and symptoms begin-DO NOT go Back to bed or wait to see if you get better-TIME IS BRAIN. Warning Signs of HEART ATTACK Call 911 if you have these symptoms: ? Chest discomfort. Most heart attacks involve discomfort in the center of the chest that lasts more than a few minutes, or that goes away and comes back. It can feel like uncomfortable pressure, squeezing, fullness, or pain. ? Discomfort in other areas of the upper body. Symptoms can include pain or discomfort in one or both arms, the back, neck, jaw, or stomach. ? Shortness of breath with or without chest discomfort. ? Other signs may include breaking out in a cold sweat, nausea, or lightheadedness. Don't wait more than five minutes to call [address]! Fast action can save your life. Calling 911 is almost always the fastest way to get lifesaving treatment. Emergency Medical Services staff can begin treatment when they arrive  up to an hour sooner than if someone gets to the hospital by car. The discharge information has been reviewed with the patient. The patient verbalized understanding. Discharge medications reviewed with the patient and appropriate educational materials and side effects teaching were provided. Patient armband removed and shredded. ___________________________________________________________________________________________________________________________________ Introducing [address]! Dax Muñoz introduces Break30 patient portal. Now you can access parts of your medical record, email your doctor's office, and request medication refills online. 1. In your internet browser, go to [email or web address] SeroMatch/Live Matrixt 2. Click on the First Time User? Click Here link in the Sign In box. You will see the New Member Sign Up page. 3. Enter your Break30 Access Code exactly as it appears below. You will not need to use this code after youve completed the sign-up process. If you do not sign up before the expiration date, you must request a new code. · MyChart Access Code: [email or web address] Expires: 10/11/2018 10:33 AM 
 
 4. Enter the last four digits of your Social Security Number (xxxx) and Date of Birth (mm/dd/yyyy) as indicated and click Submit. You will be taken to the next sign-up page. 5. Create a [employer] ID. This will be your [employer] login ID and cannot be changed, so think of one that is secure and easy to remember. 6. Create a [employer] password. You can change your password at any time. 7. Enter your Password Reset Question and Answer. This can be used at a later time if you forget your password. 8. Enter your e-mail address. You will receive e-mail notification when new information is available in [address] 9. Click Sign Up. You can now view and download portions of your medical record. 10. Click the Download Summary menu link to download a portable copy of your medical information. If you have questions, please visit the Frequently Asked Questions section of the [employer] website. Remember, [employer] is NOT to be used for urgent needs. For medical emergencies, dial 911. Now available from your iPhone and Android! Introducing Jacky Conte As a [facility] patient, I wanted to make you aware of our electronic visit tool called Jacky Conte. [facility] 24/7 allows you to connect within minutes with a medical provider 24 hours a day, seven days a week via a mobile device or tablet or logging into a secure website from your computer. You can access Jacky Gilhellenfin from anywhere in the [place]. A virtual visit might be right for you when you have a simple condition and feel like you just dont want to get out of bed, or cant get away from work for an appointment, when your regular [facility] provider is not available (evenings, weekends or holidays), or when youre out of town and need minor care.   Electronic visits cost only $49 and if the Jacky Gilrose marie provider determines a prescription is needed to treat your condition, one can be electronically transmitted to a nearby pharmacy*. Please take a moment to enroll today if you have not already done so. The enrollment process is free and takes just a few minutes. To enroll, please download the [employer] 24/7 jalil to your tablet or phone, or visit [email or web address] to enroll on your computer. And, as an [address] [place] [address] patient with a [employer] account, the results of your visits will be scanned into your electronic medical record and your primary care provider will be able to view the scanned results. We urge you to continue to see your regular [employer] provider for your ongoing medical care. And while your primary care provider may not be the one available when you seek a StepUp virtual visit, the peace of mind you get from getting a real diagnosis real time can be priceless. For more information on StepUp, view our Frequently Asked Questions (FAQs) at [email or web address]. Sincerely, 
 
Lexy Holt MD 
Chief Medical Officer [address] [place] [address] *:  certain medications cannot be prescribed via StepUp Providers Seen During Your Hospitalization Provider Specialty Primary office phone Zain Urbina MD Vascular Surgery [phone] Your Primary Care Physician (PCP) Primary Care Physician Office Phone Office Fax Rakan Kent [address] [place] [address] [phone] You are allergic to the following Allergen Reactions Aspirin Nausea and Vomiting Nausea Only Clindamycin Rash Hydroxychloroquine Vertigo Iodine Hives Itching Pcn (Penicillins) Nausea and Vomiting  
 shakes Recent Documentation Height Weight Breastfeeding? BMI OB Status Smoking Status 1.702 m 74.4 kg No 25.69 kg/m2 Hysterectomy Former Smoker Emergency Contacts Name Discharge Info Relation Home Work Mobile [employer] CAREGIVER [3] Spouse [3] [phone] Patient Belongings The following personal items are in your possession at time of discharge: 
  Dental Appliances: None  Visual Aid: None      Home Medications: None   Jewelry: None  Clothing: Pants, Shirt, Footwear    Other Valuables: None Discharge Instructions Attachments/References FEMORAL-TIBIAL BYPASS: POST-OP (ENGLISH) CLOPIDOGREL (BY MOUTH) (ENGLISH) Patient Handouts Femoral-Tibial Bypass Surgery: What to Expect at Home Your Recovery Femoral-tibial bypass is surgery to bypass diseased blood vessels in the lower leg or foot. You will have some pain from the cuts (incisions) the doctor made. The pain usually gets better after about 1 week. Your doctor will give you pain medicine. You can expect your leg to be swollen at first. This is a normal part of recovery and may last 2 or 3 months. You may need to stay in the hospital for 3 to 5 days. You will need to take it easy for 2 to 6 weeks at home. It may take 6 to 12 weeks to fully recover. You will need to have regular checkups with your doctor to make sure the graft is working. This care sheet gives you a general idea about how long it will take for you to recover. But each person recovers at a different pace. Follow the steps below to get better as quickly as possible. How can you care for yourself at home? Activity 
  · Rest when you feel tired. Getting enough sleep will help you recover.  
  · Try to walk each day or as often as your doctor tells you. Start by walking a little more than you did the day before. Bit by bit, increase the amount you walk. Walking boosts blood flow and helps prevent pneumonia and constipation.  
  · Avoid strenuous activities, such as bicycle riding, jogging, weight lifting, or aerobic exercise. Your doctor will tell youwhen it's okay to do strenuous activity.   · Ask your doctor when you can drive again.  
  · You will probably need to take 2 to 6 weeks off from work. It depends on the type of work you do and how you feel.  
  · You may shower, if your doctor okays it. Do not take a bath for the first 2 weeks, or until your doctor tells you it is okay. Diet 
  · You can eat your normal diet. If your stomach is upset, try bland, low-fat foods like plain rice, broiled chicken, toast, and yogurt.  
  · Drink plenty of fluids (unless your doctor tells you not to).  
  · You may notice that your bowel movements are not regular right after your surgery. This is common. You may want to take a fiber supplement every day. If you have not had a bowel movement after a couple of days, ask your doctor about taking a mild laxative. Medicines 
  · Your doctor will tell you if and when you can restart your medicines. He or she will also give you instructions about taking any new medicines.  
  · If you take blood thinners, such as warfarin (Coumadin), clopidogrel (Plavix), or aspirin, be sure to talk to your doctor. He or she will tell you if and when to start taking those medicines again. Make sure that you understand exactly what your doctor wants you to do.  
  · Take your medicines exactly as prescribed. Call your doctor if you think you are having a problem with your medicine.  
  · Your doctor may prescribe a blood thinner, such as aspirin, when you go home. This helps prevent blood clots.  
  · Be safe with medicines. Take pain medicines exactly as directed. ¨ If the doctor gave you a prescription medicine for pain, take it as prescribed. ¨ If you are not taking a prescription pain medicine, ask your doctor if you can take an over-the-counter medicine.  
  · If you think your pain medicine is making you sick to your stomach: 
¨ Take your medicine after meals (unless your doctor has told you not to). ¨ Ask your doctor for a different pain medicine.   · If your doctor prescribed antibiotics, take them as directed. Do not stop taking them just because you feel better. You need to take the full course of antibiotics. Incision care 
  · If you have bandages on the incisions, follow your doctor's instructions about changing them.  
  · If you have strips of tape on the cut (incision) the doctor made, leave the tape on for a week or until it falls off.  
  · Wash the area daily with water and pat it dry. Don't use hydrogen peroxide or alcohol, which can slow healing. You may cover the area with a gauze bandage if it weeps or rubs against clothing. Change the bandage every day.  
 Elevation 
  · Prop up your leg on a pillow anytime you sit or lie down during the next 3 days. Try to keep it above the level of your heart. This will help reduce swelling. Follow-up care is a key part of your treatment and safety. Be sure to make and go to all appointments, and call your doctor if you are having problems. It's also a good idea to know your test results and keep a list of the medicines you take. When should you call for help? Call 911 anytime you think you may need emergency care. For example, call if: 
  · You passed out (lost consciousness).  
  · You have trouble breathing.  
 Call your doctor now or seek immediate medical care if: 
  · You have severe pain in your leg, or it becomes cold, pale, blue, tingly, or numb.  
  · You have pain that does not get better after you take pain medicine.  
  · You have loose stitches, or your incisions come open.  
  · You are bleeding a lot from the incisions.  
  · You have signs of infection, such as: 
¨ Increased pain, swelling, warmth, or redness. ¨ Red streaks leading from the incision. ¨ Pus draining from the incision. ¨ A fever.  
  · You are sick to your stomach or cannot keep fluids down.  
 Watch closely for any changes in your health, and be sure to contact your doctor if:   · You do not get better as expected. Where can you learn more? Go to http://nevaeh-lynette.info/. Enter D025 in the search box to learn more about \"Femoral-Tibial Bypass Surgery: What to Expect at Home. \" Current as of: May 10, 2017 Content Version: 11.7 © 1394-6546 [employer]. Care instructions adapted under license by [employer] (which disclaims liability or warranty for this information). If you have questions about a medical condition or this instruction, always ask your healthcare professional. [address] any warranty or liability for your use of this information. Clopidogrel (By mouth) Clopidogrel (guxw-SSK-ns-grel) Helps prevent stroke, heart attack, and other heart problems. This medicine is a platelet inhibitor. Brand Name(s): Plavix There may be other brand names for this medicine. When This Medicine Should Not Be Used: This medicine is not right for everyone. Do not use it if you had an allergic reaction to clopidogrel. How to Use This Medicine:  
Tablet · Your doctor will tell you how much medicine to use. Do not use more than directed. · This medicine should come with a Medication Guide. Ask your pharmacist for a copy if you do not have one. · Missed dose: Take a dose as soon as you remember. If it is almost time for your next dose, wait until then and take a regular dose. Do not take extra medicine to make up for a missed dose. · Store the medicine in a closed container at room temperature, away from heat, moisture, and direct light. Drugs and Foods to Avoid: Ask your doctor or pharmacist before using any other medicine, including over-the-counter medicines, vitamins, and herbal products. · Some medicines can affect how clopidogrel works. Tell your doctor if you are using any of the following: ¨ Esomeprazole, omeprazole, repaglinide, or warfarin ¨ Medicine to treat depression ¨ NSAID pain or arthritis medicine (including celecoxib, diclofenac, ibuprofen, or naproxen) Warnings While Using This Medicine: · Tell your doctor if you are pregnant or breastfeeding, or if you have bleeding problems, stomach ulcer or bleeding, a recent stroke, or have a history of bleeding problems. · This medicine can cause you to bleed and bruise more easily. Take precautions to avoid injury. Brush and floss your teeth gently, do not play rough sports, and be careful with sharp objects. Severe bleeding can be life-threatening. · This medicine may cause a rare but serious blood clotting condition called thrombotic thrombocytopenic purpura. · Make sure any doctor or dentist who treats you knows that you are using this medicine. Tell your doctor if you plan to have surgery or a dental procedure. · Do not stop using this medicine suddenly. Your doctor will need to slowly decrease your dose before you stop it completely. · Your doctor will check your progress and the effects of this medicine at regular visits. Keep all appointments. · Keep all medicine out of the reach of children. Never share your medicine with anyone. Possible Side Effects While Using This Medicine:  
Call your doctor right away if you notice any of these side effects: · Allergic reaction: Itching or hives, swelling in your face or hands, swelling or tingling in your mouth or throat, chest tightness, trouble breathing · Bloody or black, tarry stools · Nosebleeds · Pinpoint red or purple spots on your skin or in your mouth · Problems with vision, speech, or walking · Red or dark brown urine · Seizures · Severe stomach pain · Trouble breathing, tiredness, fast heartbeat, yellow skin or eyes · Unusual bleeding, bruising, or weakness · Vomiting of blood or vomit that looks like coffee grounds If you notice other side effects that you think are caused by this medicine, tell your doctor. Call your doctor for medical advice about side effects. You may report side effects to FDA at 1-478-FDA-3408 © 2017 [address] Information is for End User's use only and may not be sold, redistributed or otherwise used for commercial purposes. The above information is an  only. It is not intended as medical advice for individual conditions or treatments. Talk to your doctor, nurse or pharmacist before following any medical regimen to see if it is safe and effective for you. Please provide this summary of care documentation to your next provider. Signatures-by signing, you are acknowledging that this After Visit Summary has been reviewed with you and you have received a copy. Patient Signature:  ____________________________________________________________ Date:  ____________________________________________________________  
  
[facility] Provider Signature:  ____________________________________________________________ Date:  ____________________________________________________________

## 2018-08-23 NOTE — INTERVAL H&P NOTE
H&P Update:  Yesenia Mei was seen and examined. History and physical has been reviewed. The patient has been examined.  There have been no significant clinical changes since the completion of the originally dated History and Physical.    Signed By: Leelee Sethi MD     August 23, 2018 1:03 PM

## 2018-08-23 NOTE — PERIOP NOTES
1741 Pt received to PACU and connected to monitor. Bedside report given by Gillian Guerrier RN. Vital signs stable. Nurse at bedside. Will continue to monitor. [address] to update pt's family on current status. 1941 TRANSFER - OUT REPORT:    Verbal report given to Miya Delgadillo RN on Gisel Enciso  being transferred to 21-23-83-89 (unit) for routine post - op       Report consisted of patients Situation, Background, Assessment and   Recommendations(SBAR). Information from the following report(s) SBAR, Kardex and MAR was reviewed with the receiving nurse. Lines:   Peripheral IV 08/23/18 Left Forearm (Active)   Site Assessment Clean, dry, & intact 8/23/2018  5:44 PM   Phlebitis Assessment 0 8/23/2018  5:44 PM   Infiltration Assessment 0 8/23/2018  5:44 PM   Dressing Status Clean, dry, & intact 8/23/2018  5:44 PM   Dressing Type Transparent;Tape 8/23/2018  5:44 PM   Hub Color/Line Status Green;End cap changed 8/23/2018  5:44 PM   Action Taken Open ports on tubing capped 8/23/2018  5:44 PM   Alcohol Cap Used Yes 8/23/2018  5:44 PM       Peripheral IV 08/23/18 Right Hand (Active)   Site Assessment Clean, dry, & intact 8/23/2018  5:44 PM   Phlebitis Assessment 0 8/23/2018  5:44 PM   Infiltration Assessment 0 8/23/2018  5:44 PM   Dressing Status Clean, dry, & intact 8/23/2018  5:44 PM   Dressing Type Transparent;Tape 8/23/2018  5:44 PM   Hub Color/Line Status Pink; Infusing 8/23/2018  5:44 PM   Action Taken Open ports on tubing capped 8/23/2018  5:44 PM   Alcohol Cap Used Yes 8/23/2018  5:44 PM        Opportunity for questions and clarification was provided.       Patient transported with:   O2 @ 2 liters  Registered Nurse

## 2018-08-24 LAB
ANION GAP SERPL CALC-SCNC: 7 MMOL/L (ref 3–18)
BASOPHILS # BLD: 0 K/UL (ref 0–0.1)
BASOPHILS NFR BLD: 0 % (ref 0–2)
BUN SERPL-MCNC: 30 MG/DL (ref 7–18)
BUN/CREAT SERPL: 17 (ref 12–20)
CA-I SERPL-SCNC: 1.13 MMOL/L (ref 1.12–1.32)
CALCIUM SERPL-MCNC: 7.5 MG/DL (ref 8.5–10.1)
CHLORIDE SERPL-SCNC: 109 MMOL/L (ref 100–108)
CO2 SERPL-SCNC: 26 MMOL/L (ref 21–32)
CREAT SERPL-MCNC: 1.77 MG/DL (ref 0.6–1.3)
DIFFERENTIAL METHOD BLD: ABNORMAL
EOSINOPHIL # BLD: 0.2 K/UL (ref 0–0.4)
EOSINOPHIL NFR BLD: 3 % (ref 0–5)
ERYTHROCYTE [DISTWIDTH] IN BLOOD BY AUTOMATED COUNT: 14.5 % (ref 11.6–14.5)
GLUCOSE BLD STRIP.AUTO-MCNC: 85 MG/DL (ref 70–110)
GLUCOSE SERPL-MCNC: 81 MG/DL (ref 74–99)
HCT VFR BLD AUTO: 28.4 % (ref 35–45)
HGB BLD-MCNC: 9.4 G/DL (ref 12–16)
LYMPHOCYTES # BLD: 1.2 K/UL (ref 0.9–3.6)
LYMPHOCYTES NFR BLD: 17 % (ref 21–52)
MAGNESIUM SERPL-MCNC: 2.2 MG/DL (ref 1.6–2.6)
MCH RBC QN AUTO: 29.6 PG (ref 24–34)
MCHC RBC AUTO-ENTMCNC: 33.1 G/DL (ref 31–37)
MCV RBC AUTO: 89.3 FL (ref 74–97)
MONOCYTES # BLD: 0.7 K/UL (ref 0.05–1.2)
MONOCYTES NFR BLD: 10 % (ref 3–10)
NEUTS SEG # BLD: 4.7 K/UL (ref 1.8–8)
NEUTS SEG NFR BLD: 70 % (ref 40–73)
PLATELET # BLD AUTO: 103 K/UL (ref 135–420)
PMV BLD AUTO: 9.7 FL (ref 9.2–11.8)
POTASSIUM SERPL-SCNC: 4.5 MMOL/L (ref 3.5–5.5)
POTASSIUM SERPL-SCNC: 4.7 MMOL/L (ref 3.5–5.5)
RBC # BLD AUTO: 3.18 M/UL (ref 4.2–5.3)
SODIUM SERPL-SCNC: 142 MMOL/L (ref 136–145)
WBC # BLD AUTO: 6.8 K/UL (ref 4.6–13.2)

## 2018-08-24 PROCEDURE — 74011250636 HC RX REV CODE- 250/636

## 2018-08-24 PROCEDURE — 77010033678 HC OXYGEN DAILY

## 2018-08-24 PROCEDURE — 84132 ASSAY OF SERUM POTASSIUM: CPT | Performed by: SURGERY

## 2018-08-24 PROCEDURE — 74011250636 HC RX REV CODE- 250/636: Performed by: ANESTHESIOLOGY

## 2018-08-24 PROCEDURE — 36415 COLL VENOUS BLD VENIPUNCTURE: CPT | Performed by: SURGERY

## 2018-08-24 PROCEDURE — 74011250636 HC RX REV CODE- 250/636: Performed by: SURGERY

## 2018-08-24 PROCEDURE — 77030011943

## 2018-08-24 PROCEDURE — 93005 ELECTROCARDIOGRAM TRACING: CPT

## 2018-08-24 PROCEDURE — 85025 COMPLETE CBC W/AUTO DIFF WBC: CPT | Performed by: SURGERY

## 2018-08-24 PROCEDURE — 82962 GLUCOSE BLOOD TEST: CPT

## 2018-08-24 PROCEDURE — 83735 ASSAY OF MAGNESIUM: CPT | Performed by: NURSE PRACTITIONER

## 2018-08-24 PROCEDURE — 74011250637 HC RX REV CODE- 250/637: Performed by: SURGERY

## 2018-08-24 PROCEDURE — 82330 ASSAY OF CALCIUM: CPT | Performed by: NURSE PRACTITIONER

## 2018-08-24 PROCEDURE — 51798 US URINE CAPACITY MEASURE: CPT

## 2018-08-24 PROCEDURE — 65270000029 HC RM PRIVATE

## 2018-08-24 PROCEDURE — 74011250637 HC RX REV CODE- 250/637: Performed by: NURSE PRACTITIONER

## 2018-08-24 RX ORDER — NALOXONE HYDROCHLORIDE 0.4 MG/ML
INJECTION, SOLUTION INTRAMUSCULAR; INTRAVENOUS; SUBCUTANEOUS
Status: COMPLETED
Start: 2018-08-24 | End: 2018-08-24

## 2018-08-24 RX ORDER — AMIODARONE HYDROCHLORIDE 150 MG/3ML
INJECTION, SOLUTION INTRAVENOUS
Status: DISCONTINUED
Start: 2018-08-24 | End: 2018-08-24

## 2018-08-24 RX ADMIN — HEPARIN SODIUM 5000 UNITS: 5000 INJECTION INTRAVENOUS; SUBCUTANEOUS at 06:34

## 2018-08-24 RX ADMIN — HEPARIN SODIUM 5000 UNITS: 5000 INJECTION INTRAVENOUS; SUBCUTANEOUS at 20:49

## 2018-08-24 RX ADMIN — HYDROMORPHONE HYDROCHLORIDE 1 MG: 1 INJECTION, SOLUTION INTRAMUSCULAR; INTRAVENOUS; SUBCUTANEOUS at 12:56

## 2018-08-24 RX ADMIN — ONDANSETRON 4 MG: 2 INJECTION, SOLUTION INTRAMUSCULAR; INTRAVENOUS at 01:09

## 2018-08-24 RX ADMIN — Medication 10 ML: at 06:35

## 2018-08-24 RX ADMIN — HEPARIN SODIUM 5000 UNITS: 5000 INJECTION INTRAVENOUS; SUBCUTANEOUS at 12:56

## 2018-08-24 RX ADMIN — OXYCODONE HYDROCHLORIDE AND ACETAMINOPHEN 1 TABLET: 5; 325 TABLET ORAL at 22:39

## 2018-08-24 RX ADMIN — HYDROMORPHONE HYDROCHLORIDE 1 MG: 1 INJECTION, SOLUTION INTRAMUSCULAR; INTRAVENOUS; SUBCUTANEOUS at 08:10

## 2018-08-24 RX ADMIN — Medication 10 ML: at 20:49

## 2018-08-24 RX ADMIN — Medication 10 ML: at 14:00

## 2018-08-24 RX ADMIN — HYDROMORPHONE HYDROCHLORIDE 0.5 MG: 1 INJECTION, SOLUTION INTRAMUSCULAR; INTRAVENOUS; SUBCUTANEOUS at 02:50

## 2018-08-24 RX ADMIN — HYDROMORPHONE HYDROCHLORIDE 1 MG: 1 INJECTION, SOLUTION INTRAMUSCULAR; INTRAVENOUS; SUBCUTANEOUS at 01:09

## 2018-08-24 RX ADMIN — NALOXONE HYDROCHLORIDE: 0.4 INJECTION, SOLUTION INTRAMUSCULAR; INTRAVENOUS; SUBCUTANEOUS at 19:19

## 2018-08-24 RX ADMIN — HYDROMORPHONE HYDROCHLORIDE 1 MG: 1 INJECTION, SOLUTION INTRAMUSCULAR; INTRAVENOUS; SUBCUTANEOUS at 16:54

## 2018-08-24 RX ADMIN — PANTOPRAZOLE SODIUM 40 MG: 40 TABLET, DELAYED RELEASE ORAL at 09:13

## 2018-08-24 RX ADMIN — LISINOPRIL 10 MG: 10 TABLET ORAL at 09:13

## 2018-08-24 NOTE — PROGRESS NOTES
2 [facility]  Hospitalist Division           Inpatient Daily Progress Note        Patient: Kat Redd MRN: [id number]  CSN: [id number]    YOB: 1940  Age: 66 y.o. Sex: female    DOA: 8/23/2018 LOS:  LOS: 1 day                        Interval History:    S/p POD 1 elective left popliteal to tibial artery bypass     Subjective: In pain          Objective:      Visit Vitals    /73    Pulse 85    Temp 98.3 °F (36.8 °C)    Resp 14    Ht 5' 7\" (1.702 m)    Wt 74.4 kg (164 lb 0.4 oz)    SpO2 98%    Breastfeeding No    BMI 25.69 kg/m2       Physical Exam:  General appearance: alert, cooperative,in some distress   Lungs: clear to auscultation bilaterally  Heart: regular rate and rhythm, S1, S2 normal, no murmur  Abdomen: soft, non tender, non distended. Normoactive bowel sounds.    Extremities: extremities normal, atraumatic, no cyanosis or edema  Skin: Skin color, texture, turgor normal; no palpable dp left; dressing intact   Neurologic: Grossly normal      Intake and Output:  Current Shift:  08/24 0701 - 08/24 1900  In: -   Out: 50 [Urine:50]  Last three shifts:  08/22 1901 - 08/24 0700  In: 1800 [I.V.:1800]  Out: 890 [Urine:790]    Recent Results (from the past 24 hour(s))   POC 6 PLUS    Collection Time: 08/23/18 11:13 AM   Result Value Ref Range    Sodium,  136 - 145 MMOL/L    Potassium, POC 4.7 3.5 - 5.5 MMOL/L    Chloride,  100 - 108 MMOL/L    BUN, POC 41 (H) 7 - 18 MG/DL    Glucose, POC 95 74 - 106 MG/DL    Hematocrit, POC 34 (L) 36 - 49 %    Hemoglobin, POC 11.6 (L) 12 - 16 G/DL   POC ACTIVATED CLOTTING TIME    Collection Time: 08/23/18  1:43 PM   Result Value Ref Range    Activated Clotting Time (POC) 114 79 - 138 SECS   POC ACTIVATED CLOTTING TIME    Collection Time: 08/23/18  3:37 PM   Result Value Ref Range    Activated Clotting Time (POC) 230 (H) 79 - 429 SECS   METABOLIC PANEL, BASIC    Collection Time: 08/24/18  4:40 AM Result Value Ref Range    Sodium 142 136 - 145 mmol/L    Potassium 4.7 3.5 - 5.5 mmol/L    Chloride 109 (H) 100 - 108 mmol/L    CO2 26 21 - 32 mmol/L    Anion gap 7 3.0 - 18 mmol/L    Glucose 81 74 - 99 mg/dL    BUN 30 (H) 7.0 - 18 MG/DL    Creatinine 1.77 (H) 0.6 - 1.3 MG/DL    BUN/Creatinine ratio 17 12 - 20      GFR est AA 34 (L) >60 ml/min/1.73m2    GFR est non-AA 28 (L) >60 ml/min/1.73m2    Calcium 7.5 (L) 8.5 - 10.1 MG/DL   CBC WITH AUTOMATED DIFF    Collection Time: 08/24/18  4:40 AM   Result Value Ref Range    WBC 6.8 4.6 - 13.2 K/uL    RBC 3.18 (L) 4.20 - 5.30 M/uL    HGB 9.4 (L) 12.0 - 16.0 g/dL    HCT 28.4 (L) 35.0 - 45.0 %    MCV 89.3 74.0 - 97.0 FL    MCH 29.6 24.0 - 34.0 PG    MCHC 33.1 31.0 - 37.0 g/dL    RDW 14.5 11.6 - 14.5 %    PLATELET 010 (L) 230 - 420 K/uL    MPV 9.7 9.2 - 11.8 FL    NEUTROPHILS 70 40 - 73 %    LYMPHOCYTES 17 (L) 21 - 52 %    MONOCYTES 10 3 - 10 %    EOSINOPHILS 3 0 - 5 %    BASOPHILS 0 0 - 2 %    ABS. NEUTROPHILS 4.7 1.8 - 8.0 K/UL    ABS. LYMPHOCYTES 1.2 0.9 - 3.6 K/UL    ABS. MONOCYTES 0.7 0.05 - 1.2 K/UL    ABS. EOSINOPHILS 0.2 0.0 - 0.4 K/UL    ABS.  BASOPHILS 0.0 0.0 - 0.1 K/UL    DF AUTOMATED     GLUCOSE, POC    Collection Time: 08/24/18  8:18 AM   Result Value Ref Range    Glucose (POC) 85 70 - 110 mg/dL   EKG, 12 LEAD, SUBSEQUENT    Collection Time: 08/24/18  9:04 AM   Result Value Ref Range    Ventricular Rate 78 BPM    Atrial Rate 78 BPM    P-R Interval 160 ms    QRS Duration 144 ms    Q-T Interval 436 ms    QTC Calculation (Bezet) 497 ms    Calculated P Axis 69 degrees    Calculated R Axis -32 degrees    Calculated T Axis 136 degrees    Diagnosis       Normal sinus rhythm  Left axis deviation  Left bundle branch block  Abnormal ECG  No previous ECGs available             Lab Results   Component Value Date/Time    Glucose 81 08/24/2018 04:40 AM    Glucose 129 (H) 07/16/2018 02:48 PM    Glucose 97 02/15/2018 05:55 AM    Glucose 91 02/14/2018 06:06 AM    Glucose 112 (H) 2018 04:16 PM        Assessment/Plan:     Patient Active Problem List   Diagnosis Code    Cellulitis of left foot L03. 116    Rheumatoid arthritis (HCC) M06.9    Peripheral vascular disease (HCC) I73.9    Atherosclerosis of arteries of extremities (HCC) I70.209       A/P:    A/P:  S/p left popliteal to tibial artery bypass  -defer primary  -pain meds prn  -defer mobilization per primary     GERD  -prevacid tomorrow am      HTN  -monitor BP  -will hold BP meds today-restart lisinopril tomorrow      Asthma  -monitor sats     ?vtach on monitor  -EKG completed; SR with RBBB present-would adjust leads     Ricki Mcghee NP-C [facility] [address]  [id number]  Office:  [phone]

## 2018-08-24 NOTE — PROGRESS NOTES
conducted an initial consultation and Spiritual Assessment for Faustina Willard, who is a 66 y.o.,female. Patients Primary Language is: [place]. According to the patients EMR Hoahaoism Affiliation is: Gnosticism.     The reason the Patient came to the hospital is:   Patient Active Problem List    Diagnosis Date Noted    Atherosclerosis of arteries of extremities ([facility] 08/23/2018    Peripheral vascular disease ([facility] [address] 02/15/2018    Rheumatoid arthritis ([facility] [address] 02/13/2018    Cellulitis of left foot 02/12/2018        The  provided the following Interventions:   attempted to initiate a relationship of care and support with patient in room 2705. Listened empathically as patient shared her pain and fears about being here. Patient had surgery yesterday and is still in acute pain today. Provided information about Spiritual Care Services. Offered prayer and assurance of continued prayers on patients behalf. The following outcomes were achieved:  Patient shared limited information about her medical narrative. Patient processed feeling about current hospitalization. Patient expressed gratitude for pastoral care visit. Assessment:  Patient does not have any Hinduism/cultural needs that will affect patients preferences in health care. There are no further spiritual or Hinduism issues which require Spiritual Care Services interventions at this time. Plan:  Chaplains will continue to follow and will provide pastoral care on an as needed/requested basis    . Vasu Moran   Spiritual Care   [phone]

## 2018-08-24 NOTE — PROGRESS NOTES
Received Philippe Garcia RN. Pt AOx4, SR/BBB, RA, Murray catheter (Plan to remove today), Lt. DP/PT pulses audible by doppler, Dressing clean/intact, bed in low position, wheels locked, call bell within reach. 0810-Per Dr. Ivan Anthony apply occlusive non-stick dressing/Kerlix to LLE wound. 0850-Per Lev Small NP pt appears to be in V-tach please obtain EKG and prepare amiodarone drip.    0900-EKG-SR/BBB per Lev Small NP Amiodarone not needed. 1415-Murray removed per Nurse driven protocol and confirmed with Vascular.

## 2018-08-24 NOTE — PROGRESS NOTES
1542-Patient arrived to unit, accompanied by ICU RN. A&Ox4, NAD. Daughter at bedside. Bed locked and in lowest position with call bell and frequently used items in reach. Encouraged to call for assistance. 1725- Pulses checked in L foot with doppler. Pulses present. Patient A&Ox4, family at bedside. 1919-Bedside and Verbal shift change report given to Sanjeev Rice  (oncoming nurse) by Shakila Stallworth (offgoing nurse). Report included the following information SBAR, Kardex, Intake/Output, MAR and Recent Results. Patient found to be drowsy and oriented to self/ situation, disoriented to time and place. Narcan given by oncoming nurse, VS taken. Patient A&Ox4 on reassessment, alert at this time.

## 2018-08-24 NOTE — PROGRESS NOTES
Reason for Admission:   LEFT POPLITEAL TO TIBIAL ARTERY BYPASS WITH GREATER SAPHENOUS VEIN                  RRAT Score:   16               Do you (patient/family) have any concerns for transition/discharge? Plan for utilizing home health:    As indciated     Likelihood of readmission?   mod            Transition of Care Plan:   SNF vs home  Interviewed patient, she agrees to share her discharge information with her spouse, David Pires . She also has a daughter Liang Ivey  who will be staying at the patient home over the next 2 weeks. Demographic information verified. She was moderately independent prior to admission and sees Dr Pricilla Headley for her primary care needs. She has a cane, walker andwheelchair at home. Her discharge plan is to return home with [facility]. Patient declined to consider rehab as discharge option. Care Management Interventions  PCP Verified by CM: Yes  Palliative Care Criteria Met (RRAT>21 & CHF Dx)?: No  Mode of Transport at Discharge: Other (see comment)  Transition of Care Consult (CM Consult):  Other, Discharge Planning (home with possible home care)  MyChart Signup: No  Discharge Durable Medical Equipment: No  Health Maintenance Reviewed: Yes  Physical Therapy Consult: No  Occupational Therapy Consult: No  Speech Therapy Consult: No  Current Support Network: Lives with Spouse  Confirm Follow Up Transport: Family  Plan discussed with Pt/Family/Caregiver: Yes  [place] Resource Information Provided?: No  Discharge Location  Discharge Placement: Home with home health (SNF vs home care)

## 2018-08-24 NOTE — PROGRESS NOTES
TRANSFER - OUT REPORT:    Verbal report given to Ashley Edward RN(name) on Will Bradley  being transferred to 2 Central (unit) for routine progression of care       Report consisted of patients Situation, Background, Assessment and   Recommendations(SBAR). Information from the following report(s) SBAR, Kardex and Cardiac Rhythm SR/BBB was reviewed with the receiving nurse. Lines:   Peripheral IV 08/23/18 Left Forearm (Active)   Site Assessment Clean, dry, & intact 8/24/2018  4:00 AM   Phlebitis Assessment 0 8/24/2018  4:00 AM   Infiltration Assessment 0 8/24/2018  4:00 AM   Dressing Status Clean, dry, & intact 8/24/2018  4:00 AM   Dressing Type Tape;Transparent 8/23/2018  8:12 PM   Hub Color/Line Status Green; Infusing 8/23/2018  8:12 PM   Action Taken Open ports on tubing capped 8/23/2018  8:12 PM   Alcohol Cap Used Yes 8/23/2018  8:12 PM       Peripheral IV 08/23/18 Right Hand (Active)   Site Assessment Clean, dry, & intact 8/24/2018  4:00 AM   Phlebitis Assessment 0 8/24/2018  4:00 AM   Infiltration Assessment 0 8/24/2018  4:00 AM   Dressing Status Clean, dry, & intact 8/24/2018  4:00 AM   Dressing Type Tape;Transparent 8/23/2018  8:12 PM   Hub Color/Line Status Pink;Capped 8/23/2018  8:12 PM   Action Taken Open ports on tubing capped 8/23/2018  8:12 PM   Alcohol Cap Used Yes 8/23/2018  8:12 PM        Opportunity for questions and clarification was provided.

## 2018-08-24 NOTE — PROGRESS NOTES
Progress Note    Patient: Benito Ross MRN: [id number]  SSN: [id number]    YOB: 1940  Age: 66 y.o. Sex: female      Admit Date: 8/23/2018    LOS: 1 day   POD 1 from left BK POP to PT  Bypass with NR L GSV. Subjective:     NO acute events overnight, Significant foot pain this morning. Objective:     Vitals:    08/24/18 0400 08/24/18 0500 08/24/18 0600 08/24/18 0700   BP: 135/49 143/66 147/63 154/73   Pulse: 71 77 80 85   Resp: 11 11 13 14   Temp: 97.3 °F (36.3 °C)   98.3 °F (36.8 °C)   SpO2: 100% 100% 100% 98%   Weight:       Height:            Intake and Output:  Current Shift:    Last three shifts: 08/22 1901 - 08/24 0700  In: 1800 [I.V.:1800]  Out: 890 [Urine:790]    Physical Exam:   GENERAL: alert, cooperative, no distress, appears stated age  THROAT & NECK: normal, no erythema or exudates noted. and neck supple and symmetrical.  mid line  LUNG: diminished breath sounds R base, L base  HEART: regular rate and rhythm, S1, S2 normal, no murmur, click, rub or gallop  ABDOMEN: soft, non-tender. Bowel sounds normal. No masses,  no organomegaly  EXTREMITIES:  LLE with c/d/i wounds, distal calf with with some ecchymosis. Foot warm. Palpable bypass pulse and PT pulse  SKIN: hyperpigmentation noted on extremities  NEUROLOGIC: AOx3. Gait normal. Reflexes and motor strength normal and symmetric. Cranial nerves 2-12 and sensation grossly intact. Lab/Data Review: All lab results for the last 24 hours reviewed. Slight increased in creatinine 1.7 from baseline 1. Continue fluids and advance diet    Assessment:     Principal Problem:    Peripheral vascular disease ([facility] [address] (2/15/2018)    Active Problems:    Atherosclerosis of arteries of extremities ([facility] [address] (8/23/2018)    POD 1 from left BK POP to PT  Bypass with NR L GSV. Progressing well.     Plan:     Advance diet  OOB to chair  Remove mary  PT/OT   Start plavix tomorrow  Transfer to floor if stable    Signed By: Roderick Omer MD August 24, 2018

## 2018-08-24 NOTE — PROGRESS NOTES
Problem: Falls - Risk of  Goal: *Absence of Falls  Document Tiffany Fall Risk and appropriate interventions in the flowsheet. Outcome: Progressing Towards Goal  Fall Risk Interventions:  Mobility Interventions: Patient to call before getting OOB, Bed/chair exit alarm         Medication Interventions: Evaluate medications/consider consulting pharmacy, Bed/chair exit alarm, Patient to call before getting OOB    Elimination Interventions: Bed/chair exit alarm, Call light in reach, Patient to call for help with toileting needs             Problem: Pressure Injury - Risk of  Goal: *Prevention of pressure injury  Document David Scale and appropriate interventions in the flowsheet. Outcome: Progressing Towards Goal  Pressure Injury Interventions:             Activity Interventions: Pressure redistribution bed/mattress(bed type)    Mobility Interventions: HOB 30 degrees or less, Pressure redistribution bed/mattress (bed type)    Nutrition Interventions: Document food/fluid/supplement intake    Friction and Shear Interventions: Apply protective barrier, creams and emollients

## 2018-08-24 NOTE — PROGRESS NOTES
2000 - Pt arrived to unit via bed, transfer onto ICU bed. Noticed one dressing has break through drainage, per OR RN new. 1 - Daughter at bedside, assisted in answer admission questions. Pt became nauseous, Zofran given. 2040 - Assessment completed, breakthrough bleeding looks unchanged, pulse checked with doppler, pedal pulse faint swoosh sound heard only, Post tibial strong. Toes are dusky  2053 - Paged Dr. Diana Perea. 2100 - Dr. Ortiz Exon called back, ok to re enforce as needed for breakthrough drainage.  Informed of pulses, was advised did not have pedal pulse in OR only post tibial.

## 2018-08-25 LAB
ANION GAP SERPL CALC-SCNC: 8 MMOL/L (ref 3–18)
ATRIAL RATE: 78 BPM
BUN SERPL-MCNC: 30 MG/DL (ref 7–18)
BUN/CREAT SERPL: 15 (ref 12–20)
CALCIUM SERPL-MCNC: 7.9 MG/DL (ref 8.5–10.1)
CALCULATED P AXIS, ECG09: 69 DEGREES
CALCULATED R AXIS, ECG10: -32 DEGREES
CALCULATED T AXIS, ECG11: 136 DEGREES
CHLORIDE SERPL-SCNC: 107 MMOL/L (ref 100–108)
CO2 SERPL-SCNC: 26 MMOL/L (ref 21–32)
CREAT SERPL-MCNC: 1.94 MG/DL (ref 0.6–1.3)
DIAGNOSIS, 93000: NORMAL
ERYTHROCYTE [DISTWIDTH] IN BLOOD BY AUTOMATED COUNT: 14.8 % (ref 11.6–14.5)
GLUCOSE SERPL-MCNC: 100 MG/DL (ref 74–99)
HCT VFR BLD AUTO: 26.4 % (ref 35–45)
HGB BLD-MCNC: 8.9 G/DL (ref 12–16)
MCH RBC QN AUTO: 29.7 PG (ref 24–34)
MCHC RBC AUTO-ENTMCNC: 33.7 G/DL (ref 31–37)
MCV RBC AUTO: 88 FL (ref 74–97)
P-R INTERVAL, ECG05: 160 MS
PLATELET # BLD AUTO: 106 K/UL (ref 135–420)
PMV BLD AUTO: 9.2 FL (ref 9.2–11.8)
POTASSIUM SERPL-SCNC: 4.3 MMOL/L (ref 3.5–5.5)
Q-T INTERVAL, ECG07: 436 MS
QRS DURATION, ECG06: 144 MS
QTC CALCULATION (BEZET), ECG08: 497 MS
RBC # BLD AUTO: 3 M/UL (ref 4.2–5.3)
SODIUM SERPL-SCNC: 141 MMOL/L (ref 136–145)
VENTRICULAR RATE, ECG03: 78 BPM
WBC # BLD AUTO: 7.2 K/UL (ref 4.6–13.2)

## 2018-08-25 PROCEDURE — 74011250636 HC RX REV CODE- 250/636: Performed by: SURGERY

## 2018-08-25 PROCEDURE — 74011000258 HC RX REV CODE- 258: Performed by: SURGERY

## 2018-08-25 PROCEDURE — 97530 THERAPEUTIC ACTIVITIES: CPT

## 2018-08-25 PROCEDURE — 97162 PT EVAL MOD COMPLEX 30 MIN: CPT

## 2018-08-25 PROCEDURE — 36415 COLL VENOUS BLD VENIPUNCTURE: CPT | Performed by: NURSE PRACTITIONER

## 2018-08-25 PROCEDURE — 65270000029 HC RM PRIVATE

## 2018-08-25 PROCEDURE — 97112 NEUROMUSCULAR REEDUCATION: CPT

## 2018-08-25 PROCEDURE — 80048 BASIC METABOLIC PNL TOTAL CA: CPT | Performed by: NURSE PRACTITIONER

## 2018-08-25 PROCEDURE — 74011250637 HC RX REV CODE- 250/637: Performed by: NURSE PRACTITIONER

## 2018-08-25 PROCEDURE — 51798 US URINE CAPACITY MEASURE: CPT

## 2018-08-25 PROCEDURE — 74011250637 HC RX REV CODE- 250/637: Performed by: SURGERY

## 2018-08-25 PROCEDURE — 85027 COMPLETE CBC AUTOMATED: CPT | Performed by: NURSE PRACTITIONER

## 2018-08-25 PROCEDURE — 77030034849

## 2018-08-25 RX ORDER — SODIUM CHLORIDE 450 MG/100ML
50 INJECTION, SOLUTION INTRAVENOUS CONTINUOUS
Status: DISCONTINUED | OUTPATIENT
Start: 2018-08-25 | End: 2018-08-27

## 2018-08-25 RX ORDER — CLOPIDOGREL BISULFATE 75 MG/1
75 TABLET ORAL DAILY
Status: DISCONTINUED | OUTPATIENT
Start: 2018-08-25 | End: 2018-08-27 | Stop reason: HOSPADM

## 2018-08-25 RX ORDER — NALOXONE HYDROCHLORIDE 0.4 MG/ML
0.4 INJECTION, SOLUTION INTRAMUSCULAR; INTRAVENOUS; SUBCUTANEOUS ONCE
Status: COMPLETED | OUTPATIENT
Start: 2018-08-25 | End: 2018-08-24

## 2018-08-25 RX ADMIN — SODIUM CHLORIDE 75 ML/HR: 450 INJECTION, SOLUTION INTRAVENOUS at 23:29

## 2018-08-25 RX ADMIN — Medication 10 ML: at 21:51

## 2018-08-25 RX ADMIN — OXYCODONE HYDROCHLORIDE AND ACETAMINOPHEN 1 TABLET: 5; 325 TABLET ORAL at 10:44

## 2018-08-25 RX ADMIN — HEPARIN SODIUM 5000 UNITS: 5000 INJECTION INTRAVENOUS; SUBCUTANEOUS at 14:00

## 2018-08-25 RX ADMIN — LISINOPRIL 10 MG: 10 TABLET ORAL at 09:40

## 2018-08-25 RX ADMIN — OXYCODONE HYDROCHLORIDE AND ACETAMINOPHEN 1 TABLET: 5; 325 TABLET ORAL at 20:09

## 2018-08-25 RX ADMIN — SODIUM CHLORIDE 75 ML/HR: 450 INJECTION, SOLUTION INTRAVENOUS at 11:53

## 2018-08-25 RX ADMIN — CLOPIDOGREL BISULFATE 75 MG: 75 TABLET ORAL at 09:30

## 2018-08-25 RX ADMIN — PANTOPRAZOLE SODIUM 40 MG: 40 TABLET, DELAYED RELEASE ORAL at 08:00

## 2018-08-25 RX ADMIN — HEPARIN SODIUM 5000 UNITS: 5000 INJECTION INTRAVENOUS; SUBCUTANEOUS at 21:51

## 2018-08-25 RX ADMIN — HEPARIN SODIUM 5000 UNITS: 5000 INJECTION INTRAVENOUS; SUBCUTANEOUS at 04:44

## 2018-08-25 NOTE — PROGRESS NOTES
Progress Note    Patient: Sanchez Vasquez MRN: [id number]  SSN: [id number]    YOB: 1940  Age: 66 y.o. Sex: female      Admit Date: 8/23/2018    LOS: 2 days   POD 2 from left BK POP to PT  Bypass with NR L GSV. Subjective:     Unable to void overnight, mary catheter replace, pain level is improving, ate breakfast w/o assistance and in a better mood. Objective:     Vitals:    08/24/18 2022 08/25/18 0038 08/25/18 0310 08/25/18 0728   BP:  113/56 145/72 138/67   Pulse:  73 78 78   Resp: 18 17 16 16   Temp:  98.7 °F (37.1 °C) 97.4 °F (36.3 °C) 98 °F (36.7 °C)   SpO2: 100% 99% 94% 94%   Weight:       Height:            Intake and Output:  Current Shift:    Last three shifts: 08/23 1901 - 08/25 0700  In: 960 [P.O.:960]  Out: 1450 [Urine:1450]    Physical Exam:   GENERAL: alert, cooperative, no distress, appears stated age  THROAT & NECK: normal, no erythema or exudates noted. and neck supple and symmetrical.  mid line  LUNG: diminished breath sounds R base, L base  HEART: regular rate and rhythm, S1, S2 normal, no murmur, click, rub or gallop  ABDOMEN: soft, non-tender. Bowel sounds normal. No masses,  no organomegaly  EXTREMITIES:  LLE with c/d/i wounds, distal calf with with some ecchymosis. Foot warm. Palpable bypass pulse and PT with biphasic signals. SKIN: hyperpigmentation noted on extremities  NEUROLOGIC: AOx3. Gait normal. Reflexes and motor strength normal and symmetric. Cranial nerves 2-12 and sensation grossly intact. Lab/Data Review: All lab results for the last 24 hours reviewed. Slight increased in creatinine 1.7 to 1.9 today. Good uop. Continue mary and fluids    Assessment:     Principal Problem:    Peripheral vascular disease ([facility] [address] (2/15/2018)    Active Problems:    Atherosclerosis of arteries of extremities ([facility] [address] (8/23/2018)    POD 2 from left BK POP to PT  Bypass with NR L GSV. Progressing well. Plan:     OOB to chair  Continue Mary and fluids, monitor Cr.  Will consult Nephrology is cont to rise. PT/OT   Start Plavix today.     Signed By: Andrés Briceno MD     August 25, 2018

## 2018-08-25 NOTE — PROGRESS NOTES
Progress Note      Patient: Rebeca Darby               Sex: female          DOA: 8/23/2018       YOB: 1940      Age:  66 y.o.        LOS:  LOS: 2 days             CHIEF COMPLAINT:  Peripheral artery disease    Subjective:     Patient has pain in left foot  No chest pain or SOB    Objective:      Visit Vitals    /63 (BP 1 Location: Left arm, BP Patient Position: At rest)    Pulse 76    Temp 97.1 °F (36.2 °C)    Resp 16    Ht 5' 7\" (1.702 m)    Wt 74.4 kg (164 lb 0.4 oz)    SpO2 94%    Breastfeeding No    BMI 25.69 kg/m2       Physical Exam:  Gen:  No distress, no complaint  Lungs:  Clear bilaterally, no wheeze or rhonchi  Heart:  Regular rate and rhythm, no murmurs or gallops  Abdomen:  Soft, non-tender, normal bowel sounds        Lab/Data Reviewed:  BMP:   Lab Results   Component Value Date/Time     08/25/2018 02:45 AM    K 4.3 08/25/2018 02:45 AM     08/25/2018 02:45 AM    CO2 26 08/25/2018 02:45 AM    AGAP 8 08/25/2018 02:45 AM     (H) 08/25/2018 02:45 AM    BUN 30 (H) 08/25/2018 02:45 AM    CREA 1.94 (H) 08/25/2018 02:45 AM    GFRAA 30 (L) 08/25/2018 02:45 AM    GFRNA 25 (L) 08/25/2018 02:45 AM     CBC:   Lab Results   Component Value Date/Time    WBC 7.2 08/25/2018 02:45 AM    HGB 8.9 (L) 08/25/2018 02:45 AM    HCT 26.4 (L) 08/25/2018 02:45 AM     (L) 08/25/2018 02:45 AM           Assessment/Plan     Principal Problem:    Peripheral vascular disease ([address] (2/15/2018)    Active Problems:    Atherosclerosis of arteries of extremities ([address] (8/23/2018)        Plan:  Pain control   Mobilize as possible  Follow renal function

## 2018-08-25 NOTE — PROGRESS NOTES
Problem: Mobility Impaired (Adult and Pediatric)  Goal: *Acute Goals and Plan of Care (Insert Text)  Physical Therapy Goals  Initiated 8/25/2018 and to be accomplished within 7 day(s)  1. Patient will move from supine to sit and sit to supine  in bed with modified independence. 2.  Patient will transfer from bed to chair and chair to bed with modified independence using the least restrictive device. 3.  Patient will perform sit to stand with modified independence. 4.  Patient will ambulate with modified independence for 50 feet with the least restrictive device. 5.  Patient will ascend/descend 5 stairs with handrail(s) with supervision/set-up. Outcome: Progressing Towards Goal  PHYSICAL THERAPY: Initial Assessment   INPATIENT: Medicare: Hospital Day: 3     Patient: Ness Sutton (26 y.o. female)    Date: 8/25/2018  Primary Diagnosis: I73.9  Atherosclerosis of arteries of extremities (HCC)   Procedure(s) (LRB):  LEFT POPLITEAL TO TIBIAL ARTERY BYPASS WITH GREATER SAPHENOUS VEIN (Left), 2 Days Post-Op   Precautions: Skin  PLOF: Independent    ASSESSMENT :  Patient requires between maximal assistance and supervision/set-up for bed mobility, transfers and ambulation. Agreeable to PT. Reports pain in LLE; however declines to provide pain rating or seek RN care for pain meds. Max A for supine to sit with additional time and cues to allow PT assistance with transfer. Seated EOB with supervision. RLE strength as noted below. LLE deferred manual muscle testing d/t pain. Able wiggle toes on LLE. Unable to lift LLE off bed with out assistance d/t pain. Maintained seated EOB 20 minutes. Performed dynamic reaching for dressing and bathing. Refused standing trial. Completed lateral scooting at EOB with supervision and additional time x2 scoots right and x5 scoots left. Demonstrated increased fatigue with sitting at end of 20 minutes trial requires min A for seated balance. Returned to supine in bed with mod A. Verbal refusal of PT education on mobility techniques at times. Elevated HOB; all needs in reach. Discharge rec to rehab d/t increased level of physical assistance with mobility as patient was independent prior to admission. Patient presents with deficits in:  Bed Mobility, Transfers, Gait, Strength, Balance and Stairs    Patient will benefit from skilled intervention to address the above impairments. Patients rehabilitation potential is considered to be Good  Factors which may influence rehabilitation potential include:   []         None noted  []         Mental ability/status  [x]         Medical condition  []         Home/family situation and support systems  []         Safety awareness  [x]         Pain tolerance/management  []         Other:      PLAN :  Recommendations and Planned Interventions:  [x]           Bed Mobility Training             [x]    Neuromuscular Re-Education  [x]           Transfer Training                   []    Orthotic/Prosthetic Training  [x]           Gait Training                          []    Modalities  [x]           Therapeutic Exercises          []    Edema Management/Control  [x]           Therapeutic Activities            [x]    Patient and Family Training/Education  []           Other (comment):      EDUCATION:   Education:  Patient was educated on the following topics: Bed mobility, transfers, ADLs, balance, safety, exercise, role of PT, plan of care, cognition, skin integrity, vitals      Barriers to Learning/Limitations: None  Compensate with: visual, verbal, tactile, kinesthetic cues/model  Frequency/Duration: Patient will be followed by physical therapy 3-5 times a week to address goals. Discharge Recommendations: Rehab  Further Equipment Recommendations for Discharge: rolling walker  Factors which may impact discharge plannin steps to enter     SUBJECTIVE:   Patient stated I'm not a 3year old.     OBJECTIVE DATA SUMMARY:     Past Medical History:   Diagnosis Date    Asthma     no ER    GERD (gastroesophageal reflux disease)     good control    Hypertension     no meds    Osteoarthritis of right hip 8/27/2012    [facility]umatoid [facility]) ([facility] [address]     Thromboembolus (HCC)     left leg    Toe amputation status, left ([facility] [address] 02/2018    2 toes removed     Past Surgical History:   Procedure Laterality Date    HX APPENDECTOMY      HX BACK SURGERY      removal bone spur    HX CATARACT REMOVAL      both eyes    HX CHOLECYSTECTOMY      HX HIP REPLACEMENT Right     HX HYSTERECTOMY      HX LUMBAR FUSION      HX ORTHOPAEDIC Left     amputation 3&4 toes of Left foot    VASCULAR SURGERY PROCEDURE UNLIST      blood clot removed     Eval Complexity: History: MEDIUM  Complexity : 1-2 comorbidities / personal factors will impact the outcome/ POC Exam:MEDIUM Complexity : 3 Standardized tests and measures addressing body structure, function, activity limitation and / or participation in recreation  Presentation: MEDIUM Complexity : Evolving with changing characteristics  Clinical Decision Making:Medium Complexity clinical judgement; ROM, MMT, functional mobility Overall Complexity:MEDIUM    G CODES:Mobility  Current  CL= 60-79%   Goal  CI= 1-19%.   The severity rating is based on the Other clinical judgement; ROM, MMT, functional mobility    Prior Level of Function/Home Situation: Independent  Home Situation  Home Environment: Private residence  # Steps to Enter: 5  Rails to Enter: Yes  Hand Rails : Bilateral  One/Two Story Residence: One story  Living Alone: No  Support Systems: Spouse/Significant Other/Partner  Patient Expects to be Discharged to[de-identified] Private residence  Current DME Used/Available at Home: Walker, rolling  Critical Behavior:  Neurologic State: Alert  Orientation Level: Oriented X4  Cognition: Follows commands  Safety/Judgement: Fall prevention  Psychosocial  Patient Behaviors: Demanding  Family  Behaviors: Supportive    Manual Muscle Testing (LE) LLE not tested d/t pain         R     L    Hip Flexion:   3+/5    Knee EXT:   4/5    Knee FLEX:   4/5    Ankle DF:   3+/5  3/5  _________________________________________________   Tone : BLE normal tone  Range Of Motion: LLE limited by pain; functional RLE AROM WFL    Functional Mobility:      Functional Status      Indep   (I)   Mod I   Super-vision   Min A   Mod A   Max A   Total A   Assist x2 Verbal cues Additional time Not tested   Comments   Rolling []  []  [] []    []    []  []  [] [] [] [x]    Supine to sit []  []  [] []  []  [x]  []  [] [] [] []    Sit to supine []  []  [] []  [x]  []  []  [] [] [] []    Sit to stand []  []  [] []  []  []  []  [] [] [] [x]    Stand to sit []  []  [] []  []  []  []  [] [] [] [x]    Bed to chair transfers []  []  [] []  []  []  []  [] [] [] [x]        Balance    Good   Fair   Poor   Unable   Not tested   Comments   Sitting static [x]  []  []  []  []    Sitting dynamic []  [x]  []  []  []    Standing static []  []  []  []  [x]    Standing dynamic []  []  []  []  [x]        Therapeutic Exercises:   Seated EOB 20 minutes with reaching tasks  Scooting   Pain: refused to provide intensity    Activity Tolerance:   Fair  Please refer to the flowsheet for vital signs taken during this treatment. After treatment:   []         Patient left in no apparent distress sitting up in chair  [x]         Patient left in no apparent distress in bed  [x]         Call bell left within reach  [x]         Nursing notified  []         Caregiver present  []         Bed alarm activated    COMMUNICATION/EDUCATION:   [x]         Fall prevention education was provided and the patient/caregiver indicated understanding. [x]         Patient/family have participated as able in goal setting and plan of care. [x]         Patient/family agree to work toward stated goals and plan of care. []         Patient understands intent and goals of therapy, but is neutral about his/her participation.   []         Patient is unable to participate in goal setting and plan of care.     Thank you for this referral.  Irene Carrasco, PT   Time Calculation: 41 mins

## 2018-08-25 NOTE — PROGRESS NOTES
0800 Received pt, dressing d/i with old drainage of left foot. Murray back, pt unable to void overnight per report. A/O x 4. IV site no sign of infiltration. Has not been out of bed yet. 0900 MD to pt room, dressing sremoved. Per MD, leave upper leg open to air, put kerlix over incision along ankle area. Murray may stay today per MD.    0930 Seen by PT, pt only able to sit on edge of bed per pt. Pt says there's too much pain. 1830 Pain under control, pillow placed under affected leg since AM, pt verbalized pain being more controlled with pillow under. Incision on upper leg open to air, lower leg with kerlix, c/d/i. Can wiggle toes, warm to touch, no numbness, no tingling, big toe cold to touch. Murray draining to clear yellow UO. IV site no sign of infiltration.

## 2018-08-25 NOTE — PROGRESS NOTES
Problem: Falls - Risk of  Goal: *Absence of Falls  Document Tiffany Fall Risk and appropriate interventions in the flowsheet. Outcome: Progressing Towards Goal  Fall Risk Interventions:  Mobility Interventions: Communicate number of staff needed for ambulation/transfer, Patient to call before getting OOB, PT Consult for mobility concerns, PT Consult for assist device competence, Strengthening exercises (ROM-active/passive), Utilize walker, cane, or other assistive device         Medication Interventions: Patient to call before getting OOB, Evaluate medications/consider consulting pharmacy, Teach patient to arise slowly    Elimination Interventions: Call light in reach, Patient to call for help with toileting needs, Toileting schedule/hourly rounds             Problem: Pressure Injury - Risk of  Goal: *Prevention of pressure injury  Document David Scale and appropriate interventions in the flowsheet. Outcome: Progressing Towards Goal  Pressure Injury Interventions:             Activity Interventions: Pressure redistribution bed/mattress(bed type)    Mobility Interventions: HOB 30 degrees or less, Pressure redistribution bed/mattress (bed type)    Nutrition Interventions: Document food/fluid/supplement intake, Offer support with meals,snacks and hydration    Friction and Shear Interventions: Foam dressings/transparent film/skin sealants, HOB 30 degrees or less, Lift sheet, Minimize layers, Apply protective barrier, creams and emollients

## 2018-08-25 NOTE — PROGRESS NOTES
1917: Bedside shift change report given to Tiki Feldman, RN (oncoming nurse) by Jose D Nunez RN (offgoing nurse). Report included the following information SBAR, Kardex, OR Summary, Intake/Output, MAR and Recent Results. 1919: Narcan given, upon receiving report patient responding to voice, RR below 12, oxygen saturation <90%, per off-going RN Dilaudid was given prior. Narcan pulled and administered, 0.4 mg. This RN at the bedside monitoring patient. 1925: Patient alert, talking with nursing staff, oxygen saturation 99%, RR >12. Pain assessed, rated 0/10.   2020: Physician on-call paged by [address] [facility] to notify bladder scan result of 201, no urge to void, attempted void via bedpan, Murray D/C at 1400.    2030: Spoke with Dr. Albert Quintanilla on-call for vascular, informed of recent VS, change in patient condition, informed Narcan was given, linked to cabinet override pull, informed patient's VS have improved. Informed Dr. Chin Medrano was D/C at [phone] prior to transfer to floor, informed patient has attempted void twice with no output, informed bladder scan was 201 ml, order to straight cath twice, if third straight cath is required please insert Murray and inform MD on-call, Dr. Albert Quintanilla states if output is greater than 400 ml upon straight cath leave Murray in overnight. 2047: Straight cath preformed. 1411: Patient states, \"I don't want to take anymore of that pain medication\", informed patient to alert staff if pain level increases to intervene with alternative measures   0330: Bladder scan >400, Murray inserted. Bedside shift change report given to Shaila Hardy, RN (oncoming nurse) by Tiki Feldman RN (offgoing nurse). Report included the following information SBAR, Kardex, OR Summary, Procedure Summary, Intake/Output, MAR and Recent Results.

## 2018-08-25 NOTE — PROGRESS NOTES
Bedside shift change report given to Guilherme Preston RN (oncoming nurse) by Erasmo Huang RN (offgoing nurse). Report included the following information SBAR, Kardex, OR Summary, Intake/Output, MAR and Recent Results. Dressing C/D/I. Patient requesting Percocet. Shift change report given to Erasmo Huang RN (oncoming nurse) by Guilherme Preston RN (offgoing nurse). Report included the following information SBAR, Kardex, OR Summary, Intake/Output, MAR and Recent Results.

## 2018-08-26 LAB
ANION GAP SERPL CALC-SCNC: 7 MMOL/L (ref 3–18)
BUN SERPL-MCNC: 24 MG/DL (ref 7–18)
BUN/CREAT SERPL: 15 (ref 12–20)
CALCIUM SERPL-MCNC: 7.8 MG/DL (ref 8.5–10.1)
CHLORIDE SERPL-SCNC: 109 MMOL/L (ref 100–108)
CO2 SERPL-SCNC: 26 MMOL/L (ref 21–32)
CREAT SERPL-MCNC: 1.59 MG/DL (ref 0.6–1.3)
GLUCOSE SERPL-MCNC: 88 MG/DL (ref 74–99)
POTASSIUM SERPL-SCNC: 4 MMOL/L (ref 3.5–5.5)
SODIUM SERPL-SCNC: 142 MMOL/L (ref 136–145)

## 2018-08-26 PROCEDURE — 97530 THERAPEUTIC ACTIVITIES: CPT | Performed by: PHYSICAL THERAPIST

## 2018-08-26 PROCEDURE — 74011250637 HC RX REV CODE- 250/637: Performed by: NURSE PRACTITIONER

## 2018-08-26 PROCEDURE — 36415 COLL VENOUS BLD VENIPUNCTURE: CPT | Performed by: SURGERY

## 2018-08-26 PROCEDURE — 74011250636 HC RX REV CODE- 250/636: Performed by: SURGERY

## 2018-08-26 PROCEDURE — 74011250637 HC RX REV CODE- 250/637: Performed by: SURGERY

## 2018-08-26 PROCEDURE — 80048 BASIC METABOLIC PNL TOTAL CA: CPT | Performed by: SURGERY

## 2018-08-26 PROCEDURE — 65270000029 HC RM PRIVATE

## 2018-08-26 RX ADMIN — HEPARIN SODIUM 5000 UNITS: 5000 INJECTION INTRAVENOUS; SUBCUTANEOUS at 22:00

## 2018-08-26 RX ADMIN — HEPARIN SODIUM 5000 UNITS: 5000 INJECTION INTRAVENOUS; SUBCUTANEOUS at 15:00

## 2018-08-26 RX ADMIN — CLOPIDOGREL BISULFATE 75 MG: 75 TABLET ORAL at 08:43

## 2018-08-26 RX ADMIN — LISINOPRIL 10 MG: 10 TABLET ORAL at 08:43

## 2018-08-26 RX ADMIN — HEPARIN SODIUM 5000 UNITS: 5000 INJECTION INTRAVENOUS; SUBCUTANEOUS at 06:37

## 2018-08-26 RX ADMIN — PANTOPRAZOLE SODIUM 40 MG: 40 TABLET, DELAYED RELEASE ORAL at 06:37

## 2018-08-26 RX ADMIN — Medication 10 ML: at 21:30

## 2018-08-26 RX ADMIN — OXYCODONE HYDROCHLORIDE AND ACETAMINOPHEN 1 TABLET: 5; 325 TABLET ORAL at 23:19

## 2018-08-26 RX ADMIN — OXYCODONE HYDROCHLORIDE AND ACETAMINOPHEN 1 TABLET: 5; 325 TABLET ORAL at 13:33

## 2018-08-26 NOTE — PROGRESS NOTES
Problem: Falls - Risk of  Goal: *Absence of Falls  Document Tiffany Fall Risk and appropriate interventions in the flowsheet. Outcome: Progressing Towards Goal  Fall Risk Interventions:  Mobility Interventions: Communicate number of staff needed for ambulation/transfer, Patient to call before getting OOB, PT Consult for mobility concerns, PT Consult for assist device competence, Strengthening exercises (ROM-active/passive), Utilize walker, cane, or other assistive device         Medication Interventions: Evaluate medications/consider consulting pharmacy, Patient to call before getting OOB, Teach patient to arise slowly    Elimination Interventions: Call light in reach, Patient to call for help with toileting needs, Toileting schedule/hourly rounds, Toilet paper/wipes in reach             Problem: Pressure Injury - Risk of  Goal: *Prevention of pressure injury  Document David Scale and appropriate interventions in the flowsheet. Outcome: Progressing Towards Goal  Pressure Injury Interventions:             Activity Interventions: Pressure redistribution bed/mattress(bed type), Increase time out of bed, PT/OT evaluation    Mobility Interventions: HOB 30 degrees or less, Pressure redistribution bed/mattress (bed type), PT/OT evaluation    Nutrition Interventions: Document food/fluid/supplement intake, Offer support with meals,snacks and hydration    Friction and Shear Interventions: Foam dressings/transparent film/skin sealants, Lift sheet, HOB 30 degrees or less, Minimize layers ([place] Mepilex)

## 2018-08-26 NOTE — PROGRESS NOTES
Progress Note      Patient: Adelfo Worrell               Sex: female          DOA: 8/23/2018       YOB: 1940      Age:  66 y.o.        LOS:  LOS: 3 days             CHIEF COMPLAINT:  Peripheral arterial disease; s/p bypass    Subjective:     Patient is awake and talking  She has pain when she mobilizes  No CP or SOB    Objective:      Visit Vitals    /66 (BP 1 Location: Left arm, BP Patient Position: At rest)    Pulse 70    Temp 97.9 °F (36.6 °C)    Resp 16    Ht 5' 7\" (1.702 m)    Wt 74.4 kg (164 lb 0.4 oz)    SpO2 94%    Breastfeeding No    BMI 25.69 kg/m2       Physical Exam:  Gen:  No distress, no complaint  Lungs:  Clear bilaterally, no wheeze or rhonchi  Heart:  Regular rate and rhythm, no murmurs or gallops  Abdomen:  Soft, non-tender, normal bowel sounds        Lab/Data Reviewed:  BMP:   Lab Results   Component Value Date/Time     08/26/2018 04:08 AM    K 4.0 08/26/2018 04:08 AM     (H) 08/26/2018 04:08 AM    CO2 26 08/26/2018 04:08 AM    AGAP 7 08/26/2018 04:08 AM    GLU 88 08/26/2018 04:08 AM    BUN 24 (H) 08/26/2018 04:08 AM    CREA 1.59 (H) 08/26/2018 04:08 AM    GFRAA 38 (L) 08/26/2018 04:08 AM    GFRNA 31 (L) 08/26/2018 04:08 AM       Assessment/Plan     Principal Problem:    Peripheral vascular disease ([address] (2/15/2018)    Active Problems:    Atherosclerosis of arteries of extremities ([facility] [address] (8/23/2018)        Plan: Mobilize as possible with Vascular  Pain control  BP control  Follow renal function  DVT prophylaxis.

## 2018-08-26 NOTE — PROGRESS NOTES
Problem: Mobility Impaired (Adult and Pediatric)  Goal: *Acute Goals and Plan of Care (Insert Text)  Physical Therapy Goals  Initiated 8/25/2018 and to be accomplished within 7 day(s)  1. Patient will move from supine to sit and sit to supine  in bed with modified independence. 2.  Patient will transfer from bed to chair and chair to bed with modified independence using the least restrictive device. 3.  Patient will perform sit to stand with modified independence. 4.  Patient will ambulate with modified independence for 50 feet with the least restrictive device. 5.  Patient will ascend/descend 5 stairs with handrail(s) with supervision/set-up. Outcome: Progressing Towards Goal    PHYSICAL THERAPY: Daily TREATMENT Note   INPATIENT: Medicare: Hospital Day: 4     Patient: Binta Castillo (58 y.o. female)    Date: 8/26/2018  Primary Diagnosis: I73.9  Atherosclerosis of arteries of extremities (HCC)   Procedure(s) (LRB):  LEFT POPLITEAL TO TIBIAL ARTERY BYPASS WITH GREATER SAPHENOUS VEIN (Left), 3 Days Post-Op,   Precautions: Skin    Chart, physical therapy assessment, plan of care and goals were reviewed. PLOF:IND    ASSESSMENT:  Pt found supine in bed with nurse present in room. Max encouragement to participate but agreeable to get up and in chair. Pt required supervision assist to perform supine to sit transfer with HOB elevated. Sit to stand with min-CGA with use of FWW. She was able to hop on R LE with toe touch weight-bearing on the L to the bed side chair. Pt denied further mobility and exercise secondary to increased pain in the L foot. Continue PT interventions to maximize overall mobility to return home IND. Progression toward goals:        Improving slowly and progressing toward goals limited by pain levels             PLAN:  Patient continues to benefit from skilled intervention to address the above impairments. Continue treatment per established plan of care.     EDUCATION:   Education: Patient was educated on the following topics: importance of participation to return to PLOF   Barriers to Learning/Limitations: None  Compensate with: visual, verbal, tactile, kinesthetic cues/model    Discharge Recommendations:  Home Health and [place]  Further Equipment Recommendations for Discharge:  TBD  Factors which may impact discharge planning: medical condition     SUBJECTIVE:   Patient stated I am in 10/10 pain but lets get this over with.     OBJECTIVE DATA SUMMARY:   Critical Behavior:  Neurologic State: Alert  Orientation Level: Oriented X4  Cognition: Appropriate decision making  Safety/Judgement: Fall prevention    G CODE:Mobility  Current  CL= 60-79%   Goal  CK= 40-59%. The severity rating is based on the Other [place] standing balance scale    [facility] Balance Scale  2: Pt supports self independently with both upper extremities (walker, crutches, parallel bars). CL, 60% to <80% impaired. Functional Mobility:      Functional Status      Indep   (I)   Mod I   Super-vision   Min A   Mod A   Max A   Total A   Assist x2 Verbal cues Additional time Not tested   Comments   Rolling []  []  [] []    []    []  []  [] [] [] [x]    Supine to sit []  []  [x] []  []  []  []  [] [] [] []    Sit to supine []  []  [] []  []  []  []  [] [] [] [x]    Sit to stand []  []  [] [x]  []  []  []  [] [] [] []    Stand to sit []  []  [] [x]  []  []  []  [] [] [] []    Bed to chair transfers []  []  [] []  []  []  []  [] [] [] [x]        Balance    Good   Fair   Poor   Unable   Not tested   Comments   Sitting static [x]  []  []  []  []    Sitting dynamic [x]  []  []  []  []    Standing static []  [x]  []  []  []    Standing dynamic []  []  [x]  []  []      Mobility/Gait:   Pt was able to hop ~ 3 steps from bed to chair with toe toe weight-bearing on the L LE using FWW CGA assist. Pt required vc's for safety and technique for balance and decreased risk of falls.      Therapeutic Exercises: Pt denied    Vital Signs  Temp: 97.9 °F (36.6 °C)     Pulse (Heart Rate): 70     BP: 161/68     Resp Rate: 16     O2 Sat (%): 95 %  Pain:  Pre treatment pain level:10/10  Post treatment pain level:10/10  Pain Scale 1: Numeric (0 - 10)  Pain Intensity 1: 0      Activity Tolerance:   Fair limited by pain levels     After treatment:   Patient left in no apparent distress sitting up in chair  Call bell left within reach      Carlos Maldonado DPT   Time Calculation: 17 mins

## 2018-08-26 NOTE — PROGRESS NOTES
Bedside shift change report given to Rachel Rogers RN (oncoming nurse) by Yo Henry RN (offgoing nurse). Report included the following information SBAR, Kardex, OR Summary, Procedure Summary, Intake/Output, MAR, Accordion and Recent Results. 1930: Ambulated to bathroom, completed HS ADLs with one staff assist, gait steady with walker, patient denies pain to operative site, returned to bed, informed patient to please alert nursing staff if pain level increases. Patient voiding well, partial unmeasurable, missed hat. Bedside shift change report given to Olvin Falcon RN (oncoming nurse) by Rachel Rogers RN (offgoing nurse). Report included the following information SBAR, Kardex, OR Summary, Intake/Output, MAR and Recent Results.

## 2018-08-26 NOTE — PROGRESS NOTES
0800 Received pt resting in bed. Pain under control. LLE elevated with pillow under. Can wiggle toes, warm to touch, no numbness, no tingling. IV site no sign of infiltration. Skin is intact. Pt with mary draining to clear yellow uo.    0900 Spoke with MD, per MD, plan today is to get pt out of bed. Once able, dc mary. 1430 Ambulated to bedside recliner with PT, partial assist with walker, tolerated well.    1600 Pt ambulated back to bed, 1 assist with walker. Pain under control. Kept left leg elevated with pillow under. 1630 Mary dc'd as ordered. 1830 Pt voided, ambulated to bedside commode. Activity tolerated well. Dressing c/d/i, can wiggle toes, warm to touch including big toes, no numbness, no tingling. Skin is intact. Pt been out of bed several times, using walker and partial assist. IV site no sign  of infiltration. Pain under control. Passing gas, no BM.    1915 Bedside and Verbal shift change report given to Nirav Ordaz (oncoming nurse) by Vasu Stone   (offgoing nurse). Report included the following information SBAR, Kardex, Intake/Output and MAR.

## 2018-08-26 NOTE — PROGRESS NOTES
Progress Note    Patient: Keegan Mai MRN: [id number]  SSN: [id number]    YOB: 1940  Age: 66 y.o. Sex: female      Admit Date: 8/23/2018    LOS: 3 days   POD 3 from left BK POP to PT  Bypass with NR L GSV. Subjective:     Still c/o pain in left foot and has not been out of bed yet. Not taking much solid PO but drinking water and lots of soda. Objective:     Vitals:    08/25/18 2011 08/25/18 2324 08/26/18 0410 08/26/18 0725   BP: 134/66 129/65 152/75 171/66   Pulse: 78 72 74 70   Resp: 16 16 14 16   Temp: 97.5 °F (36.4 °C) 97.5 °F (36.4 °C) 97.4 °F (36.3 °C) 97.9 °F (36.6 °C)   SpO2: 100% 95% 94% 94%   Weight:       Height:            Intake and Output:  Current Shift:    Last three shifts: 08/24 1901 - 08/26 0700  In: 3457.5 [P.O.:2040; I.V.:1417.5]  Out: 3410 [Urine:3410]    Physical Exam:   GENERAL: alert, cooperative, no distress, appears stated age  THROAT & NECK: normal, no erythema or exudates noted. and neck supple and symmetrical.  mid line  LUNG: diminished breath sounds R base, L base  HEART: regular rate and rhythm, S1, S2 normal, no murmur, click, rub or gallop  ABDOMEN: soft, non-tender. Bowel sounds normal. No masses,  no organomegaly  EXTREMITIES:  LLE with c/d/i wounds, distal calf with with some ecchymosis. Foot warm. Palpable bypass pulse and PT with biphasic signals. SKIN: hyperpigmentation noted on extremities  NEUROLOGIC: AOx3. Gait normal. Reflexes and motor strength normal and symmetric. Cranial nerves 2-12 and sensation grossly intact. Lab/Data Review: All lab results for the last 24 hours reviewed. Improvement  in creatinine 1.94 to 1.59 today with gentle hydration     Assessment:     Principal Problem:    Peripheral vascular disease ([address] (2/15/2018)    Active Problems:    Atherosclerosis of arteries of extremities ([address] (8/23/2018)    POD 3 from left BK POP to PT  Bypass with NR L GSV. Has not been out of bed yet.     Plan:     Needs to get OOB to chair today.   PT/OT   Renal function improving with gentle hydration, will continue and recheck labs in am.  If she gets up today, nursing to remove mary    Signed By: Viviana Fang MD     August 26, 2018

## 2018-08-27 VITALS
WEIGHT: 164.02 LBS | OXYGEN SATURATION: 98 % | BODY MASS INDEX: 25.74 KG/M2 | HEART RATE: 70 BPM | DIASTOLIC BLOOD PRESSURE: 75 MMHG | SYSTOLIC BLOOD PRESSURE: 157 MMHG | RESPIRATION RATE: 16 BRPM | TEMPERATURE: 97.9 F | HEIGHT: 67 IN

## 2018-08-27 LAB
ANION GAP SERPL CALC-SCNC: 7 MMOL/L (ref 3–18)
BUN SERPL-MCNC: 21 MG/DL (ref 7–18)
BUN/CREAT SERPL: 12 (ref 12–20)
CALCIUM SERPL-MCNC: 8.1 MG/DL (ref 8.5–10.1)
CHLORIDE SERPL-SCNC: 109 MMOL/L (ref 100–108)
CO2 SERPL-SCNC: 28 MMOL/L (ref 21–32)
CREAT SERPL-MCNC: 1.77 MG/DL (ref 0.6–1.3)
GLUCOSE SERPL-MCNC: 93 MG/DL (ref 74–99)
POTASSIUM SERPL-SCNC: 4.1 MMOL/L (ref 3.5–5.5)
SODIUM SERPL-SCNC: 144 MMOL/L (ref 136–145)

## 2018-08-27 PROCEDURE — 74011250637 HC RX REV CODE- 250/637: Performed by: NURSE PRACTITIONER

## 2018-08-27 PROCEDURE — 97116 GAIT TRAINING THERAPY: CPT

## 2018-08-27 PROCEDURE — 74011250636 HC RX REV CODE- 250/636: Performed by: SURGERY

## 2018-08-27 PROCEDURE — 36415 COLL VENOUS BLD VENIPUNCTURE: CPT | Performed by: SURGERY

## 2018-08-27 PROCEDURE — 74011250637 HC RX REV CODE- 250/637: Performed by: SURGERY

## 2018-08-27 PROCEDURE — 80048 BASIC METABOLIC PNL TOTAL CA: CPT | Performed by: SURGERY

## 2018-08-27 RX ORDER — CLOPIDOGREL BISULFATE 75 MG/1
75 TABLET ORAL DAILY
Qty: 30 TAB | Refills: 3 | Status: SHIPPED | OUTPATIENT
Start: 2018-08-28 | End: 2018-10-14

## 2018-08-27 RX ADMIN — HEPARIN SODIUM 5000 UNITS: 5000 INJECTION INTRAVENOUS; SUBCUTANEOUS at 15:00

## 2018-08-27 RX ADMIN — PANTOPRAZOLE SODIUM 40 MG: 40 TABLET, DELAYED RELEASE ORAL at 06:30

## 2018-08-27 RX ADMIN — LISINOPRIL 10 MG: 10 TABLET ORAL at 08:48

## 2018-08-27 RX ADMIN — HEPARIN SODIUM 5000 UNITS: 5000 INJECTION INTRAVENOUS; SUBCUTANEOUS at 05:55

## 2018-08-27 RX ADMIN — Medication 10 ML: at 08:48

## 2018-08-27 RX ADMIN — Medication 10 ML: at 16:11

## 2018-08-27 RX ADMIN — CLOPIDOGREL BISULFATE 75 MG: 75 TABLET ORAL at 08:48

## 2018-08-27 NOTE — DISCHARGE SUMMARY
Discharge Summary     Patient: Janett Maddox MRN: [id number]  SSN: [id number]    YOB: 1940  Age: 66 y.o. Sex: female       Admit Date: 8/23/2018    Discharge Date: 8/27/2018      Admission Diagnoses: I73.9  Atherosclerosis of arteries of extremities [facility])    Discharge Diagnoses:   Problem List as of 8/27/2018  Date Reviewed: 8/23/2018          Codes Class Noted - Resolved    Atherosclerosis of arteries of extremities ([facility] ICD-10-CM: I70.209  ICD-9-CM: 440.20  8/23/2018 - Present        * (Principal)Peripheral vascular disease ([facility] ICD-10-CM: I73.9  ICD-9-CM: 443.9  2/15/2018 - Present        Rheumatoid arthritis ([facility] ICD-10-CM: M06.9  ICD-9-CM: 714.0  2/13/2018 - Present        Cellulitis of left foot ICD-10-CM: L03.116  ICD-9-CM: 682.7  2/12/2018 - Present        RESOLVED: Osteoarthritis of right hip (Chronic) ICD-10-CM: M16.11  ICD-9-CM: 715.95  8/27/2012 - 8/28/2012               Discharge Condition: [facility] Course: 65 yo female with severe LLE PVD with ischemic toes was admitted after LLE BK pop to PT bypass with left Reversed GSV. Patient tolerated procedure well without complications. She was taken to ICU for overnight observation. Pain was controlled with po pain medication and IV for breakthrough. She had urinary retention on POD 1 and mary needed placement again due to poor mobility. Patient started PT/OT and ambulation on POD 2 and was able to void spontaneously on POD 3 and was ambulating with minimal assistance. She was deemed stable for discharge on POD 3 with plavix and po pain medication. Consults: Hospitalist    Significant Diagnostic Studies: labs: Increased creatinine stable with good urine output    Disposition: home    Discharge Medications:   Current Discharge Medication List      START taking these medications    Details   clopidogrel (PLAVIX) 75 mg tab Take 1 Tab by mouth daily.   Qty: 30 Tab, Refills: 3         CONTINUE these medications which have NOT CHANGED    Details   lansoprazole (PREVACID) 15 mg capsule Take 1 capsule every day by oral route. lisinopril (PRINIVIL, ZESTRIL) 10 mg tablet Take  by mouth daily. oxyCODONE-acetaminophen (PERCOCET) 5-325 mg per tablet Take 1 Tab by Mouth Every 4 Hours As Needed for Pain. Do not exceed 4000 mg of acetaminophen per day. loratadine (CLARITIN) 10 mg tablet Take 1 tablet every day by oral route as needed. folic acid (FOLVITE) 1 mg tablet folic acid 1 mg tablet      PRENATAL -IRON-FOLIC ACID PO 1 a day (gummie)      methotrexate (RHEUMATREX) 2.5 mg tablet Take 2.5 mg by mouth Every Thursday. acetaminophen (ACETAMINOPHEN EXTRA STRENGTH) 500 mg tablet Take 1,000 mg by mouth every six (6) hours as needed. Activity: Activity as tolerated  Diet: Diabetic Diet  Wound Care: Keep wound clean and dry. Clean with soap and water, then pad dry lightly    Follow-up Appointments   Procedures    FOLLOW UP VISIT Appointment in: Two Weeks     Standing Status:   Standing     Number of Occurrences:   1     Order Specific Question:   Appointment in     Answer:    Two Weeks       Signed By: Guadalupe Samuels MD     August 27, 2018

## 2018-08-27 NOTE — DISCHARGE INSTRUCTIONS
DISCHARGE SUMMARY from Nurse    PATIENT INSTRUCTIONS:    After general anesthesia or intravenous sedation, for 24 hours or while taking prescription Narcotics:  · Limit your activities  · Do not drive and operate hazardous machinery  · Do not make important personal or business decisions  · Do  not drink alcoholic beverages  · If you have not urinated within 8 hours after discharge, please contact your surgeon on call. Report the following to your surgeon:  · Excessive pain, swelling, redness or odor of or around the surgical area  · Temperature over 100.5  · Nausea and vomiting lasting longer than 4 hours or if unable to take medications  · Any signs of decreased circulation or nerve impairment to extremity: change in color, persistent  numbness, tingling, coldness or increase pain  · Any questions    What to do at Home:  Recommended activity: Activity as tolerated. If you experience any of the symptoms above,please follow up with . *  Please give a list of your current medications to your Primary Care Provider. *  Please update this list whenever your medications are discontinued, doses are      changed, or new medications (including over-the-counter products) are added. *  Please carry medication information at all times in case of emergency situations. These are general instructions for a healthy lifestyle:    No smoking/ No tobacco products/ Avoid exposure to second hand smoke  Surgeon General's Warning:  Quitting smoking now greatly reduces serious risk to your health.     Obesity, smoking, and sedentary lifestyle greatly increases your risk for illness    A healthy diet, regular physical exercise & weight monitoring are important for maintaining a healthy lifestyle    You may be retaining fluid if you have a history of heart failure or if you experience any of the following symptoms:  Weight gain of 3 pounds or more overnight or 5 pounds in a week, increased swelling in our hands or feet or shortness of breath while lying flat in bed. Please call your doctor as soon as you notice any of these symptoms; do not wait until your next office visit. Recognize signs and symptoms of STROKE:    F-face looks uneven    A-arms unable to move or move unevenly    S-speech slurred or non-existent    T-time-call 911 as soon as signs and symptoms begin-DO NOT go       Back to bed or wait to see if you get better-TIME IS BRAIN. Warning Signs of HEART ATTACK     Call 911 if you have these symptoms:   Chest discomfort. Most heart attacks involve discomfort in the center of the chest that lasts more than a few minutes, or that goes away and comes back. It can feel like uncomfortable pressure, squeezing, fullness, or pain.  Discomfort in other areas of the upper body. Symptoms can include pain or discomfort in one or both arms, the back, neck, jaw, or stomach.  Shortness of breath with or without chest discomfort.  Other signs may include breaking out in a cold sweat, nausea, or lightheadedness. Don't wait more than five minutes to call 911 - MINUTES MATTER! Fast action can save your life. Calling 911 is almost always the fastest way to get lifesaving treatment. Emergency Medical Services staff can begin treatment when they arrive -- up to an hour sooner than if someone gets to the hospital by car. The discharge information has been reviewed with the patient. The patient verbalized understanding. Discharge medications reviewed with the patient and appropriate educational materials and side effects teaching were provided. Patient armband removed and shredded.   ___________________________________________________________________________________________________________________________________

## 2018-08-27 NOTE — PROGRESS NOTES
Problem: Falls - Risk of  Goal: *Absence of Falls  Document Tiffany Fall Risk and appropriate interventions in the flowsheet. Outcome: Progressing Towards Goal  Fall Risk Interventions:  Mobility Interventions: Communicate number of staff needed for ambulation/transfer, Patient to call before getting OOB, OT consult for ADLs, PT Consult for mobility concerns, PT Consult for assist device competence, Strengthening exercises (ROM-active/passive), Utilize walker, cane, or other assistive device         Medication Interventions: Evaluate medications/consider consulting pharmacy, Patient to call before getting OOB, Teach patient to arise slowly    Elimination Interventions: Call light in reach, Patient to call for help with toileting needs, Toileting schedule/hourly rounds             Problem: Pressure Injury - Risk of  Goal: *Prevention of pressure injury  Document David Scale and appropriate interventions in the flowsheet. Outcome: Progressing Towards Goal  Pressure Injury Interventions:             Activity Interventions: Increase time out of bed, Pressure redistribution bed/mattress(bed type), PT/OT evaluation    Mobility Interventions: HOB 30 degrees or less, Pressure redistribution bed/mattress (bed type), PT/OT evaluation    Nutrition Interventions: Document food/fluid/supplement intake, Offer support with meals,snacks and hydration    Friction and Shear Interventions: HOB 30 degrees or less, Foam dressings/transparent film/skin sealants

## 2018-08-27 NOTE — PROGRESS NOTES
Physical Exam   Skin:             Bedside and Verbal shift change report given to Mara Loving, RN (oncoming nurse) by Wendy Sorto RN (offgoing nurse). Report included the following information SBAR, Kardex, Intake/Output and MAR.     2465 - this RN offered to change linen for pt but pt refused at this time. Pt states she will call nurses station when she is ready. NAD noted at this time, will cont to monitor. 1030 - linen changed by this RN, pt ambulating in hallway w/PT. 1257 - new drainage on foot dressing. Dressing changed, removed kerlix, cleansed wound w/dermal wound cleanser, applied 4x4 & kerlix. Pt tolerated poorly. Will cont to monitor. 1456 - confirmed w/Dr. Estuardo Hernandes that heparin to be administered. 1851 - pt wanting to know when she can go home. Contacted Dr. Estuardo Hernandes. Telephone orders to d/c received. 1907- per Dr. Bettina Sesay pt okay to leave w/o [facility] PO/OT & CM to set up in AM. F/u w/Dr. Bettina Sesay in 2 weeks. D/c order received.

## 2018-08-27 NOTE — PROGRESS NOTES
Progress Note    Patient: Pete Javed MRN: [id number]  SSN: [id number]    YOB: 1940  Age: 66 y.o. Sex: female      Admit Date: 8/23/2018    LOS: 4 days   POD 3 from left BK POP to PT  Bypass with NR L GSV. Subjective:     Voiding, pain better control but limited movement due to pain. Objective:     Vitals:    08/27/18 0011 08/27/18 0522 08/27/18 0851 08/27/18 1430   BP: 149/71 154/75 135/71 157/75   Pulse: 96 70  70   Resp: 16 16 16 16   Temp: 98.8 °F (37.1 °C) 98.6 °F (37 °C) 98.3 °F (36.8 °C) 97.9 °F (36.6 °C)   SpO2: 96% 94% 95% 98%   Weight:       Height:            Intake and Output:  Current Shift: 08/27 0701 - 08/27 1900  In: 243.3 [P.O.:120; I.V.:123.3]  Out: 900 [Urine:900]  Last three shifts: 08/25 1901 - 08/27 0700  In: 4511.3 [P.O.:1920; I.V.:2591.3]  Out: 3480 [Urine:3480]    Physical Exam:   GENERAL: alert, cooperative, no distress, appears stated age  THROAT & NECK: normal, no erythema or exudates noted. and neck supple and symmetrical.  mid line  LUNG: diminished breath sounds R base, L base  HEART: regular rate and rhythm, S1, S2 normal, no murmur, click, rub or gallop  ABDOMEN: soft, non-tender. Bowel sounds normal. No masses,  no organomegaly  EXTREMITIES:  LLE with c/d/i wounds, distal calf with with some ecchymosis. Foot warm. Palpable bypass pulse and PT with biphasic signals. SKIN: hyperpigmentation noted on extremities  NEUROLOGIC: AOx3. Gait normal. Reflexes and motor strength normal and symmetric. Cranial nerves 2-12 and sensation grossly intact. Lab/Data Review: All lab results for the last 24 hours reviewed. Slight increased in creatinine 1.7 to 1.9 today. Good uop. Continue mary and fluids    Assessment:     Principal Problem:    Peripheral vascular disease ([address] (2/15/2018)    Active Problems:    Atherosclerosis of arteries of extremities ([facility] (8/23/2018)    POD 3 from left BK POP to PT  Bypass with NR L GSV. Progressing well.     Plan:     OOB to chair  Ambulate as tolerated  D/c home with pt/ot  PT/OT   Asa/plavix statin    Signed By: Rajesh Adrian MD     August 27, 2018

## 2018-08-27 NOTE — PROGRESS NOTES
Problem: Mobility Impaired (Adult and Pediatric)  Goal: *Acute Goals and Plan of Care (Insert Text)  Physical Therapy Goals  Initiated 8/25/2018 and to be accomplished within 7 day(s)  1. Patient will move from supine to sit and sit to supine  in bed with modified independence. 2.  Patient will transfer from bed to chair and chair to bed with modified independence using the least restrictive device. 3.  Patient will perform sit to stand with modified independence. 4.  Patient will ambulate with modified independence for 50 feet with the least restrictive device. 5.  Patient will ascend/descend 5 stairs with handrail(s) with supervision/set-up. Outcome: Resolved/Met Date Met: 08/27/18  PHYSICAL THERAPY: Daily Treatment Note/Discharge   INPATIENT: Medicare: Hospital Day: 5     Patient: Clarke Barnes (68 y.o. female)    Date: 8/27/2018  Primary Diagnosis: I73.9  Atherosclerosis of arteries of extremities (HCC)   Procedure(s) (LRB):  LEFT POPLITEAL TO TIBIAL ARTERY BYPASS WITH GREATER SAPHENOUS VEIN (Left), 4 Days Post-Op,   Precautions: Fall  Chart, physical therapy assessment, plan of care and goals were reviewed. PLOF: Independent    ASSESSMENT:  Mod I for supine to sit and sit to stand. Refusing use of socks or shoes despite max education on safety, fall prevention, hygiene, and infection control. Amb 100ft mod I with ww. Steady gait; appropriate safety with ww. Returned to restroom and completed standing tasks and toileting with mod I for balance. Completed alternating toe taps to 8 inch block x10 with steady balance. Deferred trial of stairs in stairwell d/t refusal to wear socks causing safety concern. Denies further mobility concerns for return home. Seated in chair at end of session with all needs in reach. RN Birder Hodgkins aware.      EDUCATION:   Education:  Patient was educated on the following topics: Bed mobility, transfers, ADLs, balance, amb, safety, exercise, role of PT, plan of care, cognition, skin integrity, vitals     Barriers to Learning/Limitations: None  Compensate with: visual, verbal, tactile, kinesthetic cues/model    Progression toward goals:  [x]      Goals met  []      Improving appropriately and progressing toward goals  []      Improving slowly and progressing toward goals  []      Not making progress toward goals and plan of care will be adjusted     PLAN:  Patient will be discharged from physical therapy at this time. Rationale for discharge:  [x] Goals Achieved  [] [address]  [] Patient not participating in therapy  [] Other:  Discharge Recommendations:  None  Further Equipment Recommendations for Discharge:  rolling walker; has     SUBJECTIVE:   Patient stated They hurt. I won't wear them.     OBJECTIVE DATA SUMMARY:   Critical Behavior:  Neurologic State: Alert  Orientation Level: Oriented X4  Cognition: Follows commands  Safety/Judgement: Fall prevention    G CODE:Mobility  [id number] Goal  CI= 1-19%  D/C  CI= 1-19%. The severity rating is based on the Other [facility] Balance Scale 4+/5    [facility] Balance Scale 4+/5  0: Pt performs 25% or less of standing activity (Max assist) CN, 100% impaired. 1: Pt supports self with upper extremities but requires therapist assistance. Pt performs 25-50% of effort (Mod assist) CM, 80% to <100% impaired. 1+: Pt supports self with upper extremities but requires therapist assistance. Pt performs >50% effort. (Min assist). CL, 60% to <80% impaired. 2: Pt supports self independently with both upper extremities (walker, crutches, parallel bars). CL, 60% to <80% impaired. 2+: Pt support self independently with 1 upper extremity (cane, crutch, 1 parallel bar). CK, 40% to <60% impaired. 3: Pt stands without upper extremity support for up to 30 seconds. CK, 40% to <60% impaired. 3+: Pt stands without upper extremity support for 30 seconds or greater. CJ, 20% to <40% impaired.   4: Pt independently moves and returns center of gravity 1-2 inches in one plane. CJ, 20% to <40% impaired. 4+: Pt independently moves and returns center of gravity 1-2 inches in multiple planes. CI, 1% to <20% impaired. 5: Pt independently moves and returns center of gravity in all planes greater than 2 inches. CH, 0% impaired.     Functional Mobility:      Functional Status      Indep   (I)   Mod I   Super-vision   Min A   Mod A   Max A   Total A   Assist x2 Verbal cues Additional time Not tested   Comments   Rolling []  []  [] []    []    []  []  [] [] [] [x]    Supine to sit []  [x]  [] []  []  []  []  [] [] [] []    Sit to supine []  []  [] []  []  []  []  [] [] [] [x]    Sit to stand []  [x]  [] []  []  []  []  [] [] [] []    Stand to sit []  [x]  [] []  []  []  []  [] [] [] []    Bed to chair transfers []  []  [] []  []  []  []  [] [] [] [x]        Balance    Good   Fair   Poor   Unable   Not tested   Comments   Sitting static [x]  []  []  []  []    Sitting dynamic [x]  []  []  []  []    Standing static [x]  []  []  []  []    Standing dynamic [x]  []  []  []  []          Mobility/Gait:   Level of Assistance: Modified independent  Assistive Device: rolling walker  Distance Ambulated: 100 feet       Neuro Re-Education:  Standing tasks 5 minutes  Therapeutic Exercises:   Alternating toe taps 8inch x10    Pain:  Pre treatment pain level: 0  Post treatment pain level: 0  Pain Scale 1: Visual  Pain Intensity 1: 0  Activity Tolerance:   Fair     After treatment:   Patient left in no apparent distress sitting up in chair  Call bell left within reach  Nursing notified    Tramaine Pritchard, PT   Time Calculation: 25 mins

## 2018-08-27 NOTE — PROGRESS NOTES
2 [facility]  Hospitalist Division           Inpatient Daily Progress Note        Patient: Claritza Ellington MRN: [id number]  CSN: [id number]    YOB: 1940  Age: 66 y.o. Sex: female    DOA: 8/23/2018 LOS:  LOS: 4 days                        Interval History:    s/p elective left popliteal to tibial artery bypass     Subjective:     Pain much better   Patient is ready to go home      Objective:      Visit Vitals    /71 (BP 1 Location: Right arm, BP Patient Position: At rest)    Pulse 70    Temp 98.3 °F (36.8 °C)    Resp 16    Ht 5' 7\" (1.702 m)    Wt 74.4 kg (164 lb 0.4 oz)    SpO2 95%    Breastfeeding No    BMI 25.69 kg/m2       Physical Exam:  General appearance: alert, cooperative,in some distress   Lungs: clear to auscultation bilaterally  Heart: regular rate and rhythm, S1, S2 normal, no murmur  Abdomen: soft, non tender, non distended. Normoactive bowel sounds. Extremities: extremities normal, atraumatic, no cyanosis or edema  Skin: Skin color, texture, turgor normal; no palpable dp left; dressing intact   Neurologic: Grossly normal      Intake and Output:  Current Shift:  08/27 0701 - 08/27 1900  In: 243.3 [P.O.:120; I.V.:123.3]  Out: 900 [Urine:900]  Last three shifts:  08/25 1901 - 08/27 0700  In: 4511.3 [P.O.:1920;  I.V.:2591.3]  Out: 3480 [Urine:3480]    Recent Results (from the past 24 hour(s))   METABOLIC PANEL, BASIC    Collection Time: 08/27/18  3:45 AM   Result Value Ref Range    Sodium 144 136 - 145 mmol/L    Potassium 4.1 3.5 - 5.5 mmol/L    Chloride 109 (H) 100 - 108 mmol/L    CO2 28 21 - 32 mmol/L    Anion gap 7 3.0 - 18 mmol/L    Glucose 93 74 - 99 mg/dL    BUN 21 (H) 7.0 - 18 MG/DL    Creatinine 1.77 (H) 0.6 - 1.3 MG/DL    BUN/Creatinine ratio 12 12 - 20      GFR est AA 34 (L) >60 ml/min/1.73m2    GFR est non-AA 28 (L) >60 ml/min/1.73m2    Calcium 8.1 (L) 8.5 - 10.1 MG/DL           Lab Results   Component Value Date/Time Glucose 93 08/27/2018 03:45 AM    Glucose 88 08/26/2018 04:08 AM    Glucose 100 (H) 08/25/2018 02:45 AM    Glucose 81 08/24/2018 04:40 AM    Glucose 129 (H) 07/16/2018 02:48 PM        Assessment/Plan:     Patient Active Problem List   Diagnosis Code    Cellulitis of left foot L03. 116    Rheumatoid arthritis ([facility]) M06.9    Peripheral vascular disease ([facility]) I73.9    Atherosclerosis of arteries of extremities ([facility]) I70.209       A/P:    A/P:  S/p left popliteal to tibial artery bypass  -defer primary  -pain meds prn  -defer mobilization per primary     GERD  -prevacid      HTN  -monitor BP  -home meds      Asthma  -monitor sats     ?vtach on monitor  -EKG completed; SR with RBBB present-would adjust leads     Discharge planning home with home health     Kinga Melgoza, FENG [facility]  [phone]  Office:  [phone]

## 2018-09-20 PROBLEM — T81.31XA DEHISCENCE OF EXTERNAL SURGICAL WOUND: Status: ACTIVE | Noted: 2018-09-20

## 2018-10-14 PROBLEM — I96 GANGRENE OF FOOT (HCC): Status: ACTIVE | Noted: 2018-10-14

## 2018-11-06 PROBLEM — I73.9 PVD (PERIPHERAL VASCULAR DISEASE) (HCC): Status: ACTIVE | Noted: 2018-11-06

## 2018-12-17 PROBLEM — N28.9 RENAL INSUFFICIENCY: Status: ACTIVE | Noted: 2018-12-17

## 2018-12-17 PROBLEM — R06.02 SOB (SHORTNESS OF BREATH): Status: ACTIVE | Noted: 2018-12-17

## 2018-12-17 PROBLEM — I50.9 ACUTE EXACERBATION OF CHF (CONGESTIVE HEART FAILURE) (HCC): Status: ACTIVE | Noted: 2018-12-17

## 2018-12-17 PROBLEM — R77.8 ELEVATED TROPONIN: Status: ACTIVE | Noted: 2018-12-17

## 2019-01-09 PROBLEM — I73.9 PERIPHERAL VASCULAR DISEASE WITH PAIN AT REST (HCC): Status: ACTIVE | Noted: 2019-01-09

## 2019-01-17 PROBLEM — A04.72 C. DIFFICILE COLITIS: Status: ACTIVE | Noted: 2019-01-17

## 2019-01-25 PROBLEM — J96.01 ACUTE RESPIRATORY FAILURE WITH HYPOXIA (HCC): Status: ACTIVE | Noted: 2019-01-25

## 2019-01-25 PROBLEM — J90 PLEURAL EFFUSION: Status: ACTIVE | Noted: 2019-01-25

## 2019-01-25 PROBLEM — A04.72 C. DIFFICILE COLITIS: Status: RESOLVED | Noted: 2019-01-17 | Resolved: 2019-01-25

## 2019-01-26 PROBLEM — J90 PLEURAL EFFUSION, BILATERAL: Status: ACTIVE | Noted: 2019-01-26

## 2019-01-26 PROBLEM — R06.03 RESPIRATORY DISTRESS: Status: ACTIVE | Noted: 2019-01-26

## 2019-02-13 PROBLEM — J81.1 PULMONARY EDEMA: Status: ACTIVE | Noted: 2019-02-13

## 2019-03-05 PROBLEM — E87.1 HYPONATREMIA: Status: ACTIVE | Noted: 2019-03-05

## 2019-03-05 PROBLEM — E43 SEVERE PROTEIN-CALORIE MALNUTRITION (HCC): Status: ACTIVE | Noted: 2019-03-05

## 2019-03-05 PROBLEM — J96.01 ACUTE RESPIRATORY FAILURE WITH HYPOXIA (HCC): Status: RESOLVED | Noted: 2019-01-25 | Resolved: 2019-03-05

## 2019-03-05 PROBLEM — E87.6 HYPOKALEMIA: Status: RESOLVED | Noted: 2019-03-05 | Resolved: 2019-03-05

## 2019-03-05 PROBLEM — I50.23 SYSTOLIC CHF, ACUTE ON CHRONIC (HCC): Status: ACTIVE | Noted: 2019-03-05

## 2019-03-05 PROBLEM — I34.0 MITRAL REGURGITATION: Status: ACTIVE | Noted: 2019-03-05

## 2019-03-05 PROBLEM — R62.7 FAILURE TO THRIVE IN ADULT: Status: ACTIVE | Noted: 2019-03-05

## 2019-03-05 PROBLEM — J81.1 PULMONARY EDEMA: Status: RESOLVED | Noted: 2019-02-13 | Resolved: 2019-03-05

## 2019-03-05 PROBLEM — N18.5 CKD (CHRONIC KIDNEY DISEASE) STAGE 5, GFR LESS THAN 15 ML/MIN (HCC): Status: ACTIVE | Noted: 2019-03-05

## 2019-03-05 PROBLEM — I50.23 SYSTOLIC CHF, ACUTE ON CHRONIC (HCC): Status: RESOLVED | Noted: 2019-03-05 | Resolved: 2019-03-05

## 2019-03-05 PROBLEM — C34.11 PRIMARY ADENOCARCINOMA OF UPPER LOBE OF RIGHT LUNG (HCC): Status: ACTIVE | Noted: 2019-03-05

## 2019-03-05 PROBLEM — E87.6 HYPOKALEMIA: Status: ACTIVE | Noted: 2019-03-05

## 2021-08-03 PROBLEM — I73.9 PERIPHERAL VASCULAR DISEASE (HCC): Status: RESOLVED | Noted: 2018-02-15 | Resolved: 2021-08-03

## 2021-08-03 PROBLEM — I73.9 PVD (PERIPHERAL VASCULAR DISEASE) (HCC): Status: RESOLVED | Noted: 2018-11-06 | Resolved: 2021-08-03

## 2024-03-05 NOTE — OP NOTES
[facility] DIV[facility]  OPERATIVE REPORT    Freddy Falcon  MR#: [id number]  : 1940  ACCOUNT #: [de-identified]   DATE OF SERVICE: 2018    PREOPERATIVE DIAGNOSIS:  Atherosclerotic occlusive disease with gangrene. POSTOPERATIVE DIAGNOSIS:  Atherosclerotic occlusive disease with gangrene. PROCEDURE PERFORMED:  Left below knee popliteal to posterior tibial artery bypass with nonreversed greater saphenous vein. SURGEON:  Naveen Blakely MD    ASSISTANT:  Ines Nelson MD    ANESTHESIA:  General endotracheal.    ESTIMATED BLOOD LOSS:  50 mL    SPECIMENS REMOVED:  None. FINDINGS:  Include a palpable posterior tibial pulse at the conclusion of the case. COMPLICATIONS:  None. IMPLANTS:  None. INDICATIONS:  The patient is the unfortunate 51-year-old female who has failed endovascular revascularization of her left leg and has developed gangrene of her toes. She is taken for a popliteal to posterior tibial artery bypass. PROCEDURE IN DETAIL:  Once informed consent was obtained, the patient was taken to the operating room and placed supine on the operating table. General endotracheal anesthesia was induced and a surgical timeout was performed. The patient was identified and procedure verified. The patient was then prepped and draped in the normal sterile fashion. The left leg was incised over the previously marked saphenous vein just above the knee. Skin and subcutaneous tissues were divided. The saphenous vein was identified. It was dissected free from its surrounding tissues. Branch points were ligated with silk sutures and surgical clips. The saphenous vein was followed distally and a skin bridge was made and an incision below the knee over the saphenous vein was made and this was followed down to the proximal calf over the vein and became too small for use. Dr. Peter Lima continued with the vein harvest proximally. The below knee popliteal fossa was opened. The popliteal artery was identified. It was dissected free from surrounding tissues and surrounded both proximally and distally with vessel loops. An exploration of the anterior tibial artery distally was made and the artery was found to be not of adequate size for use for bypass; therefore, an exploration of the posterior tibial artery revealed an adequate artery. The saphenous vein was harvested and flushed with saline. Leak points were closed with 7-0 Prolene suture. The patient was systemically heparinized with 7000 units of heparin. The proximal and distal control of the popliteal artery was gained. An arteriotomy was made. This was extended with Massey scissors. The vein was spatulated and brought down in a nonreversed fashion. An end-to-side anastomosis was then completed with 6-0 Prolene suture. The clamps posteriorly released to avoid any air embolus and there was noted to be good pulse within the proximal graft. The vein was marked so as to avoid any twisting within the tunnel. The tunnel was made subcutaneously and tunneled down to the ankle incision. The ureterocele valvulotome was then used to establish pulsatile flow by lysing the valves within the vein and once pulsatile flow was established proximal and distal control of the posterior tibial artery was gained. An arteriotomy was made. This was extended with Massey scissors. The vein was spatulated and cut to length and brought down in an end-to-side anastomotic fashion with a 6-0 Prolene suture. The clamps were released and there was noted to be good flow distally within the posterior tibial artery; however, there was noted to be bleeding from the tunnel. The ankle incision was opened proximally and a bleeding point within the vein was identified and repaired with a 7-0 Prolene suture. The wounds were irrigated with bacitracin and saline. After hemostasis had been achieved.  The upper wounds were closed with Vicryl and Monocryl and covered with Dermabond and the lower wounds were covered with a 3-0 Prolene suture in an interrupted fashion. The patient was extubated and sent to PACU in stable condition after she had been cleaned and dressed. There were no immediate complications to the procedure. All sponge, needle and instrument counts were correct x2.       MD KIMI Ross / PRASANNA  D: 08/24/2018 16:24     T: 08/25/2018 13:48  JOB #: [id number] [Follow-Up Visit] : a follow-up

## (undated) DEVICE — SYRINGE 200ML ANGIO MEDRAD

## (undated) DEVICE — (D)PREP SKN CHLRAPRP APPL 26ML -- CONVERT TO ITEM 371833

## (undated) DEVICE — APPLICATOR BNDG 1MM ADH PREMIERPRO EXOFIN

## (undated) DEVICE — SUTURE PROL SZ 5-0 L24IN NONABSORBABLE BLU L9.3MM BV-1 3/8 9702H

## (undated) DEVICE — DRAPE THER FLUID WARMING 66X44 IN FLAT SLUSH DBL DISC ORS

## (undated) DEVICE — KIT CATH OD16FR 5ML BLLN SIL URIN INDWL STR TIP INF CTRL

## (undated) DEVICE — FABRIC REINFORCED, SURGICAL GOWN, XL: Brand: CONVERTORS

## (undated) DEVICE — SUT SLK 4-0 18IN TIE MP BLK --

## (undated) DEVICE — Device

## (undated) DEVICE — SURGIFOAM SPNG SZ 100

## (undated) DEVICE — HEX-LOCKING BLADE ELECTRODE: Brand: EDGE

## (undated) DEVICE — SUTURE PROL SZ 7-0 L24IN NONABSORBABLE BLU BV-1 L9.3MM 3/8 8304H

## (undated) DEVICE — SUTURE PERMAHAND SZ 3-0 L18IN NONABSORBABLE BLK SILK BRAID A184H

## (undated) DEVICE — SUTURE MCRYL SZ 4-0 L18IN ABSRB UD L19MM PS-2 3/8 CIR PRIM Y496G

## (undated) DEVICE — SURGICAL PROCEDURE TRAY CUST ANGIO KT SUPPLEMENT

## (undated) DEVICE — TELFA NON-ADHERENT ABSORBENT DRESSING: Brand: TELFA

## (undated) DEVICE — SUT SLK 3-0 30IN SH BLK --

## (undated) DEVICE — SUTURE VCRL SZ 3-0 L27IN ABSRB UD L26MM SH 1/2 CIR J416H

## (undated) DEVICE — X-RAY SPONGES,12 PLY: Brand: DERMACEA

## (undated) DEVICE — OCCLUSIVE GAUZE STRIP,3% BISMUTH TRIBROMOPHENATE IN PETROLATUM BLEND: Brand: XEROFORM

## (undated) DEVICE — SOLUTION IV 500ML 0.9% SOD CHL FLX CONT

## (undated) DEVICE — Z DISCONTINUED USE 2219801 STAPLER SKIN REG CRWN L5.7MM LEG L3.9MM WIRE DIA0.53MM PROX

## (undated) DEVICE — BLADE OPHTH 180DEG CUT SURF BLU STR SHRP DBL BVL GRINDLESS

## (undated) DEVICE — TAPE UMB 1/8X30IN MP COT WHT --

## (undated) DEVICE — 3M™ TEGADERM™ TRANSPARENT FILM DRESSING FRAME STYLE, 1626W, 4 IN X 4-3/4 IN (10 CM X 12 CM), 50/CT 4CT/CASE: Brand: 3M™ TEGADERM™

## (undated) DEVICE — LOOP VES MAXI RED

## (undated) DEVICE — COVER US PRB W15XL120CM W/ GEL RUBBERBAND TAPE STRP FLD GEN

## (undated) DEVICE — HEAD CUT VEIN SLV VLVTOM 2- --

## (undated) DEVICE — KENDALL SCD EXPRESS SLEEVES, KNEE LENGTH, MEDIUM: Brand: KENDALL SCD

## (undated) DEVICE — CLIP INT SM WIDE RED TI TRNSVRS GRV CHEVRON SHP W PRECIS

## (undated) DEVICE — SUT SLK 2-0SH 30IN BLK --

## (undated) DEVICE — TUBING PRSS 48IN 1200PSI CLR HI PRSS INJ EXCITE FLX

## (undated) DEVICE — SOLUTION IV 1000ML 0.9% SOD CHL

## (undated) DEVICE — SUT PROL 6-0 24IN BV1 DA BLU --

## (undated) DEVICE — KIT PROC VASC MAJ CUST LTX STR --

## (undated) DEVICE — SKIN MARKER,REGULAR TIP WITH RULER AND LABELS: Brand: DEVON

## (undated) DEVICE — VESSEL LOOPS,MINI, BLUE: Brand: DEVON

## (undated) DEVICE — SYR 10ML CTRL LR LCK NSAF LF --